# Patient Record
Sex: MALE | Race: WHITE | NOT HISPANIC OR LATINO | Employment: FULL TIME | URBAN - METROPOLITAN AREA
[De-identification: names, ages, dates, MRNs, and addresses within clinical notes are randomized per-mention and may not be internally consistent; named-entity substitution may affect disease eponyms.]

---

## 2017-04-03 ENCOUNTER — ALLSCRIPTS OFFICE VISIT (OUTPATIENT)
Dept: OTHER | Facility: OTHER | Age: 62
End: 2017-04-03

## 2017-04-03 DIAGNOSIS — Z00.00 ENCOUNTER FOR GENERAL ADULT MEDICAL EXAMINATION WITHOUT ABNORMAL FINDINGS: ICD-10-CM

## 2018-01-11 NOTE — PROGRESS NOTES
Assessment    1  Encounter for preventive health examination (V70 0) (Z00 00)   2  Cardiac arrhythmia (427 9) (I49 9)    Plan  Cardiac arrhythmia    · We want you to wear a Holter monitor ; Status:Complete;   Done: 44YKZ7884 09:28AM  Health Maintenance    · Follow-up visit in 1 year Evaluation and Treatment  Follow-up  Status: Hold For -  Scheduling  Requested for: 03Apr2017   · We encourage all of our patients to exercise regularly  30 minutes of exercise or physical  activity five or more days a week is recommended for children and adults ;  Status:Complete;   Done: 18ZZA8885 09:28AM   · We recommend routine visits to a dentist ; Status:Complete;   Done: 88JSN9073 09:28AM   · We recommend that you follow the "Mediterranean diet "; Status:Complete;   Done:  14MHZ4681 09:28AM    Check CBC, PSA, CMP, Fasting lipids  F/U after Holter     History of Present Illness  HM, Adult Male: The patient is being seen for a health maintenance evaluation  Social History: Household members include spouse  He is   Work status: working full time  The patient has never smoked cigarettes  General Health: He has regular dental visits  He complains of vision problems  Vision care includes wearing glasses  He denies hearing loss  Immunizations status: up to date  Lifestyle:  He consumes a diverse and healthy diet  He does not have any weight concerns  He exercises regularly  Reproductive health:  the patient is sexually active  Screening:   HPI: 62 y/o WM first visit in 1 1/2 yrs  Doing well, no acute c/o  However, he does report noting a low heart rate in the 40m ezekiel at night, more than his resting HR normally in the 50s  The pt does jog daily, at least 4miles/d and this has not changed  DeNies CP, SOB, dizziness, edema, fatigue  Review of Systems    Constitutional: as noted in HPI  Eyes: No complaints of eye pain, no red eyes, no discharge from eyes, no itchy eyes     ENT: no complaints of earache, no hearing loss, no nosebleeds, no nasal discharge, no sore throat, no hoarseness  Cardiovascular: slow heart rate, but as noted in HPI, no chest pain and no palpitations  Respiratory: No complaints of shortness of breath, no wheezing, no cough, no SOB on exertion, no orthopnea or PND  Gastrointestinal: No complaints of abdominal pain, no constipation, no nausea or vomiting, no diarrhea or bloody stools  Genitourinary: No complaints of dysuria, no incontinence, no hesitancy, no nocturia, no genital lesion, no testicular pain  Musculoskeletal: No complaints of arthralgia, no myalgias, no joint swelling or stiffness, no limb pain or swelling  Integumentary: No complaints of skin rash or skin lesions, no itching, no skin wound, no dry skin  Neurological: No compliants of headache, no confusion, no convulsions, no numbness or tingling, no dizziness or fainting, no limb weakness, no difficulty walking  Psychiatric: Is not suicidal, no sleep disturbances, no anxiety or depression, no change in personality, no emotional problems  Endocrine: No complaints of proptosis, no hot flashes, no muscle weakness, no erectile dysfunction, no deepening of the voice, no feelings of weakness  Hematologic/Lymphatic: No complaints of swollen glands, no swollen glands in the neck, does not bleed easily, no easy bruising  ROS reviewed  Past Medical History    · History of esophageal reflux (V12 79) (Z87 19)   · History of Left foot pain (729 5) (Y68 050)    Surgical History    · History of Oral Surgery Tooth Extraction Thurmond Tooth   · History of Umbilical Hernia Repair - Over Age 13    Family History  Mother    · Family history of Hodgkin's lymphoma (V16 7) (Z80 7)  Father    · No pertinent family history    Social History    · Admits alcohol use (V49 89) (Z78 9)   · Lives with spouse   ·    · Never a smoker    Current Meds   1  DHEA 25 MG Oral Capsule; Therapy: (Recorded:03Apr2017) to Recorded   2   EpiPen 2-Francesco 0 3 MG/0 3ML (1:1000) EJ; Therapy: (Recorded:03Apr2017) to Recorded   3  Fish Oil 1000 MG Oral Capsule; Therapy: (Recorded:03Apr2017) to Recorded   4  Multi For Him 50+ TABS; Therapy: (Recorded:03Apr2017) to Recorded   5  Prostate Therapy Complex Oral Capsule; Therapy: (Recorded:03Apr2017) to Recorded   6  Vitamin D-3 1000 UNIT Oral Capsule; Therapy: (Recorded:03Apr2017) to Recorded   7  V-R Vitamin C 500 MG TABS; Therapy: (Recorded:03Apr2017) to Recorded    Allergies    1  No Known Drug Allergies    Vitals   Recorded: [id number] 61:04VF   Systolic 581, LUE, Sitting   Diastolic 72, LUE, Sitting   Height 6 ft 1 in   Weight 164 lb    BMI Calculated 21 64   BSA Calculated 1 98     Physical Exam    Constitutional   General appearance: No acute distress, well appearing and well nourished  Eyes   Conjunctiva and lids: No swelling, erythema, or discharge  Pupils and irises: Equal, round and reactive to light  Ears, Nose, Mouth, and Throat   External inspection of ears and nose: Normal     Oropharynx: Normal with no erythema, edema, exudate or lesions  Pulmonary   Respiratory effort: No increased work of breathing or signs of respiratory distress  Auscultation of lungs: Clear to auscultation  Cardiovascular   Auscultation of heart: Normal rate and rhythm, normal S1 and S2, without murmurs  Examination of extremities for edema and/or varicosities: Normal     Abdomen   Abdomen: Non-tender, no masses  Liver and spleen: No hepatomegaly or splenomegaly  Lymphatic   Palpation of lymph nodes in neck: No lymphadenopathy  Musculoskeletal   Gait and station: Normal     Digits and nails: Normal without clubbing or cyanosis  Inspection/palpation of joints, bones, and muscles: Normal     Skin   Skin and subcutaneous tissue: Normal without rashes or lesions  Neurologic   Cranial nerves: Cranial nerves 2-12 intact  Reflexes: 2+ and symmetric  Sensation: No sensory loss      Psychiatric Orientation to person, place and time: Normal     Mood and affect: Normal        Signatures   Electronically signed by : DAÍRO Stevenson ; Apr  3 2017  9:29AM EST                       (Author)

## 2018-01-13 VITALS
WEIGHT: 164 LBS | BODY MASS INDEX: 21.74 KG/M2 | DIASTOLIC BLOOD PRESSURE: 72 MMHG | HEIGHT: 73 IN | SYSTOLIC BLOOD PRESSURE: 140 MMHG

## 2018-04-23 ENCOUNTER — TELEPHONE (OUTPATIENT)
Dept: FAMILY MEDICINE CLINIC | Facility: CLINIC | Age: 63
End: 2018-04-23

## 2018-04-23 ENCOUNTER — OFFICE VISIT (OUTPATIENT)
Dept: FAMILY MEDICINE CLINIC | Facility: CLINIC | Age: 63
End: 2018-04-23
Payer: COMMERCIAL

## 2018-04-23 VITALS
SYSTOLIC BLOOD PRESSURE: 108 MMHG | WEIGHT: 159 LBS | HEART RATE: 56 BPM | OXYGEN SATURATION: 99 % | HEIGHT: 73 IN | DIASTOLIC BLOOD PRESSURE: 74 MMHG | TEMPERATURE: 98.3 F | BODY MASS INDEX: 21.07 KG/M2

## 2018-04-23 DIAGNOSIS — Z12.5 SCREENING FOR PROSTATE CANCER: ICD-10-CM

## 2018-04-23 DIAGNOSIS — M25.552 HIP PAIN, LEFT: ICD-10-CM

## 2018-04-23 DIAGNOSIS — R10.32 LEFT LOWER QUADRANT PAIN: ICD-10-CM

## 2018-04-23 DIAGNOSIS — Z00.00 ROUTINE ADULT HEALTH MAINTENANCE: Primary | ICD-10-CM

## 2018-04-23 PROBLEM — I49.9 CARDIAC ARRHYTHMIA: Status: RESOLVED | Noted: 2017-04-03 | Resolved: 2018-04-23

## 2018-04-23 PROBLEM — I49.9 CARDIAC ARRHYTHMIA: Status: ACTIVE | Noted: 2017-04-03

## 2018-04-23 PROBLEM — H53.9 VISUAL DISTURBANCE OF ONE EYE: Status: ACTIVE | Noted: 2018-04-23

## 2018-04-23 PROCEDURE — 99214 OFFICE O/P EST MOD 30 MIN: CPT | Performed by: PHYSICIAN ASSISTANT

## 2018-04-23 RX ORDER — ASCORBIC ACID 500 MG
1000 TABLET ORAL
COMMUNITY
End: 2020-05-13 | Stop reason: CLARIF

## 2018-04-23 RX ORDER — EPINEPHRINE 0.3 MG/.3ML
INJECTION SUBCUTANEOUS
COMMUNITY

## 2018-04-23 RX ORDER — CHLORAL HYDRATE 500 MG
CAPSULE ORAL
COMMUNITY

## 2018-04-23 RX ORDER — UBIDECARENONE 30 MG
CAPSULE ORAL
COMMUNITY
End: 2022-06-20
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
## 2018-04-23 NOTE — ASSESSMENT & PLAN NOTE
Has been consistent for 6 months  May be due to aggravation of previous hernia repair versus other  Will order ultrasound left lower quadrant to evaluate further

## 2018-04-23 NOTE — ASSESSMENT & PLAN NOTE
Exam consistent with hip arthritis, which can radiate to the groin  Will order x-ray of the left hip  If positive for significant arthritis will recommend he avoid a lot of high impact activities such as running and transitioning to low impact activities such as swimming and cycling

## 2018-04-23 NOTE — ASSESSMENT & PLAN NOTE
Believed to be due to arterial occlusion per opthalmology w/u    Continue f/u with them   Advised not to drive without wife

## 2018-04-23 NOTE — TELEPHONE ENCOUNTER
Called patient's wife and discussed the topic of not allowing her  Grisel Barlow to drive until the medical issues are resolved and cleared by his eye dr  She agreed with that measure  She stated she would inform us of the outcome of the upcoming medical tests

## 2018-04-25 ENCOUNTER — HOSPITAL ENCOUNTER (OUTPATIENT)
Dept: ULTRASOUND IMAGING | Facility: HOSPITAL | Age: 63
Discharge: HOME | End: 2018-04-25
Payer: COMMERCIAL

## 2018-04-25 ENCOUNTER — TELEPHONE (OUTPATIENT)
Dept: FAMILY MEDICINE CLINIC | Facility: CLINIC | Age: 63
End: 2018-04-25

## 2018-04-25 ENCOUNTER — HOSPITAL ENCOUNTER (OUTPATIENT)
Dept: RADIOLOGY | Facility: HOSPITAL | Age: 63
Discharge: HOME | End: 2018-04-25
Payer: COMMERCIAL

## 2018-04-25 DIAGNOSIS — M25.552 HIP PAIN, LEFT: ICD-10-CM

## 2018-04-25 DIAGNOSIS — R10.32 LEFT LOWER QUADRANT PAIN: ICD-10-CM

## 2018-04-25 PROCEDURE — 76705 ECHO EXAM OF ABDOMEN: CPT

## 2018-04-25 PROCEDURE — 73502 X-RAY EXAM HIP UNI 2-3 VIEWS: CPT

## 2018-04-25 NOTE — TELEPHONE ENCOUNTER
Informed patient of normal abdominal ultrasound   ----- Message from Hesham Singleton PA-C sent at 4/25/2018 10:27 AM EDT -----  Please tell pt US was normal without evidence of recurrent hernia

## 2018-04-26 LAB
ALBUMIN SERPL-MCNC: 4.4 G/DL (ref 3.6–4.8)
ALBUMIN/GLOB SERPL: 1.8 {RATIO} (ref 1.2–2.2)
ALP SERPL-CCNC: 69 IU/L (ref 39–117)
ALT SERPL-CCNC: 19 IU/L (ref 0–44)
AMBIG ABBREV DEFAULT: NORMAL
AST SERPL-CCNC: 21 IU/L (ref 0–40)
BILIRUB SERPL-MCNC: 0.7 MG/DL (ref 0–1.2)
BUN SERPL-MCNC: 14 MG/DL (ref 8–27)
BUN/CREAT SERPL: 18 (ref 10–24)
CALCIUM SERPL-MCNC: 9.6 MG/DL (ref 8.6–10.2)
CHLORIDE SERPL-SCNC: 103 MMOL/L (ref 96–106)
CHOLEST SERPL-MCNC: 162 MG/DL (ref 100–199)
CO2 SERPL-SCNC: 28 MMOL/L (ref 18–29)
CREAT SERPL-MCNC: 0.77 MG/DL (ref 0.76–1.27)
GLOBULIN SER-MCNC: 2.5 G/DL (ref 1.5–4.5)
GLUCOSE SERPL-MCNC: 91 MG/DL (ref 65–99)
HDLC SERPL-MCNC: 56 MG/DL
LDLC SERPL CALC-MCNC: 96 MG/DL (ref 0–99)
POTASSIUM SERPL-SCNC: 4.5 MMOL/L (ref 3.5–5.2)
PROT SERPL-MCNC: 6.9 G/DL (ref 6–8.5)
PSA SERPL-MCNC: 4.7 NG/ML (ref 0–4)
SL AMB EGFR AFRICAN AMERICAN: 113 ML/MIN/1.73
SL AMB EGFR NON AFRICAN AMERICAN: 98 ML/MIN/1.73
SODIUM SERPL-SCNC: 143 MMOL/L (ref 134–144)
TRIGL SERPL-MCNC: 52 MG/DL (ref 0–149)

## 2018-05-03 ENCOUNTER — TELEPHONE (OUTPATIENT)
Dept: FAMILY MEDICINE CLINIC | Facility: CLINIC | Age: 63
End: 2018-05-03

## 2018-05-03 NOTE — TELEPHONE ENCOUNTER
----- Message from Boo Flood PA-C sent at 5/1/2018  1:25 PM EDT -----  Please tell pt xray showed hip arthritis  He should perform gentle stretching and start transitioning from running to lower impact activities such as cycling/swimming/ He may take aleve or advil as needed for pain    Contacted patient regarding hip xray results  Informed patient of arthritis and the need to switch to lower impact exercise  Inquired about results of carotid ultrasound  Patient unaware of that yet  Patient stated his vision is slowly coming back  He feels it gets better when his heart rate is increased  Informed patient to continue to follow up with his eye doctor and call us if he needs anything else  Lexi Kerns

## 2018-05-14 ENCOUNTER — TELEPHONE (OUTPATIENT)
Dept: FAMILY MEDICINE CLINIC | Facility: CLINIC | Age: 63
End: 2018-05-14

## 2018-05-14 NOTE — TELEPHONE ENCOUNTER
Pt's wife called requesting names of local neurologists  She stated that her 's eye dr has recommended him to see a neurologist  I gave her the names and numbers for [facility], [facility] and [facility]   Roya Schuster

## 2018-08-15 ENCOUNTER — TELEPHONE (OUTPATIENT)
Dept: FAMILY MEDICINE CLINIC | Facility: CLINIC | Age: 63
End: 2018-08-15

## 2018-08-15 DIAGNOSIS — M25.552 HIP PAIN, LEFT: Primary | ICD-10-CM

## 2018-08-15 NOTE — TELEPHONE ENCOUNTER
Patient called stating that he stopped running and his left hip pain is getting worse  Does patient need to come see you again for this or can you recommend someone to see? Please advise  Thanks

## 2018-08-20 ENCOUNTER — TELEPHONE (OUTPATIENT)
Dept: FAMILY MEDICINE CLINIC | Facility: CLINIC | Age: 63
End: 2018-08-20

## 2018-08-20 NOTE — TELEPHONE ENCOUNTER
----- Message from Mic Joyner PA-C sent at 8/15/2018  9:51 AM EDT -----  Referral to ortho Dr Jero Pro placed   Ask his preferred pharmacy and I can send meloxicam to be taken once daily with food to help with sx

## 2018-08-20 NOTE — TELEPHONE ENCOUNTER
Left message on patients answering machine to return phone call regarding referral and pharmacy for rx

## 2018-08-22 DIAGNOSIS — M25.552 HIP PAIN, LEFT: Primary | ICD-10-CM

## 2018-08-22 RX ORDER — MELOXICAM 15 MG/1
15 TABLET ORAL DAILY
Qty: 30 TABLET | Refills: 5 | Status: SHIPPED | OUTPATIENT
Start: 2018-08-22 | End: 2018-10-01 | Stop reason: SDUPTHER

## 2018-08-22 NOTE — TELEPHONE ENCOUNTER
Patient returned phone call stating that he has appointment with Ortho on 8/31/18  Can you please send the Meloxicam into his pharmacy? I entered his pharmacy into his chart   Thanks

## 2018-08-30 ENCOUNTER — APPOINTMENT (OUTPATIENT)
Dept: RADIOLOGY | Facility: CLINIC | Age: 63
End: 2018-08-30
Payer: COMMERCIAL

## 2018-08-30 ENCOUNTER — TRANSCRIBE ORDERS (OUTPATIENT)
Dept: ADMINISTRATIVE | Facility: HOSPITAL | Age: 63
End: 2018-08-30

## 2018-08-30 VITALS
BODY MASS INDEX: 21.52 KG/M2 | DIASTOLIC BLOOD PRESSURE: 66 MMHG | WEIGHT: 162.4 LBS | HEIGHT: 73 IN | SYSTOLIC BLOOD PRESSURE: 116 MMHG | HEART RATE: 47 BPM

## 2018-08-30 DIAGNOSIS — M16.12 PRIMARY OSTEOARTHRITIS OF ONE HIP, LEFT: ICD-10-CM

## 2018-08-30 DIAGNOSIS — M25.552 HIP PAIN, LEFT: Primary | ICD-10-CM

## 2018-08-30 DIAGNOSIS — M25.552 HIP PAIN, LEFT: ICD-10-CM

## 2018-08-30 DIAGNOSIS — M16.12 PRIMARY OSTEOARTHRITIS OF ONE HIP, LEFT: Primary | ICD-10-CM

## 2018-08-30 PROCEDURE — 99203 OFFICE O/P NEW LOW 30 MIN: CPT | Performed by: ORTHOPAEDIC SURGERY

## 2018-08-30 PROCEDURE — 73502 X-RAY EXAM HIP UNI 2-3 VIEWS: CPT
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
## 2018-09-28 ENCOUNTER — HOSPITAL ENCOUNTER (OUTPATIENT)
Dept: RADIOLOGY | Facility: HOSPITAL | Age: 63
Discharge: HOME | End: 2018-09-28

## 2018-09-28 ENCOUNTER — HOSPITAL ENCOUNTER (OUTPATIENT)
Dept: RADIOLOGY | Facility: HOSPITAL | Age: 63
Discharge: HOME | End: 2018-09-28
Attending: ORTHOPAEDIC SURGERY
Payer: COMMERCIAL

## 2018-09-28 ENCOUNTER — HOSPITAL ENCOUNTER (OUTPATIENT)
Dept: MRI IMAGING | Facility: HOSPITAL | Age: 63
Discharge: HOME | End: 2018-09-28
Attending: ORTHOPAEDIC SURGERY
Payer: COMMERCIAL

## 2018-09-28 DIAGNOSIS — M25.552 HIP PAIN, LEFT: ICD-10-CM

## 2018-09-28 DIAGNOSIS — M16.12 PRIMARY OSTEOARTHRITIS OF ONE HIP, LEFT: ICD-10-CM

## 2018-09-28 DIAGNOSIS — T15.90XA FOREIGN BODY, EYE, UNSPECIFIED LATERALITY, INITIAL ENCOUNTER: ICD-10-CM

## 2018-09-28 PROCEDURE — 77002 NEEDLE LOCALIZATION BY XRAY: CPT

## 2018-09-28 PROCEDURE — 73722 MRI JOINT OF LWR EXTR W/DYE: CPT

## 2018-09-28 PROCEDURE — 27095 INJECTION FOR HIP X-RAY: CPT

## 2018-09-28 PROCEDURE — A9585 GADOBUTROL INJECTION: HCPCS | Performed by: ORTHOPAEDIC SURGERY

## 2018-09-28 RX ORDER — 0.9 % SODIUM CHLORIDE 0.9 %
50 VIAL (ML) INJECTION
Status: DISCONTINUED | OUTPATIENT
Start: 2018-09-28 | End: 2018-09-29 | Stop reason: HOSPADM

## 2018-09-28 RX ORDER — LIDOCAINE HYDROCHLORIDE 10 MG/ML
10 INJECTION, SOLUTION INFILTRATION; PERINEURAL
Status: DISCONTINUED | OUTPATIENT
Start: 2018-09-28 | End: 2018-09-29 | Stop reason: HOSPADM

## 2018-09-28 RX ADMIN — GADOBUTROL 0.2 ML: 604.72 INJECTION INTRAVENOUS at 08:35

## 2018-09-28 RX ADMIN — IOHEXOL 50 ML: 300 INJECTION, SOLUTION INTRAVENOUS at 08:35

## 2018-10-01 VITALS
DIASTOLIC BLOOD PRESSURE: 74 MMHG | BODY MASS INDEX: 21.6 KG/M2 | HEIGHT: 73 IN | HEART RATE: 80 BPM | SYSTOLIC BLOOD PRESSURE: 128 MMHG | WEIGHT: 163 LBS

## 2018-10-01 DIAGNOSIS — M25.552 HIP PAIN, LEFT: Primary | ICD-10-CM

## 2018-10-01 DIAGNOSIS — S73.192A TEAR OF LEFT ACETABULAR LABRUM, INITIAL ENCOUNTER: ICD-10-CM

## 2018-10-01 PROCEDURE — 99213 OFFICE O/P EST LOW 20 MIN: CPT | Performed by: ORTHOPAEDIC SURGERY

## 2018-10-01 RX ORDER — MELOXICAM 15 MG/1
15 TABLET ORAL DAILY
Qty: 30 TABLET | Refills: 0 | Status: SHIPPED | OUTPATIENT
Start: 2018-10-01 | End: 2019-08-23

## 2018-10-01 NOTE — PROGRESS NOTES
Assessment/Plan     1  Hip pain, left    2  Tear of left acetabular labrum, initial encounter      Orders Placed This Encounter   Procedures    Ambulatory referral to Physical Therapy   -patient will start physical therapy  -continue with meloxicam as needed for pain control  -will consider steroid injection in left hip pain persist    Return for 4-6 weeks   Subjective   Chief Complaint:   Chief Complaint   Patient presents with    Left Hip - Pain       Tammy Chaney is a 66 y o  male who presents for follow up for left hip pain today  He states he still has some pain in his groin that radiates down his thigh  He does have some numbness and tingling in his anterior thigh  He has been taking meloxicam which has been helping with his knee pain  His hip pain has been improving since he was last here  He is here to go over his MRI results  He has not had a steroid injection  He has not done any physical therapy  Review of Systems  See HPI for musculoskeletal review     All other systems reviewed are negative     History:  Past Medical History:   Diagnosis Date    Esophageal reflux      Past Surgical History:   Procedure Laterality Date    [place] INJECTION LEFT HIP (ARTHROGRAM)  8/75/1628    UMBILICAL HERNIA REPAIR      over age 13   [facility] [de[facility]identified[facility] TOOTH [facility]TRACTION       Social History   History   Alcohol Use    Yes     History   Drug use: Unknown     History   Smoking Status    Never Smoker   Smokeless Tobacco    Never Used     Family History:   Family History   Problem Relation Age of Onset    Hodgkin's lymphoma Mother     No Known Problems Father        Meds/Allergies     (Not in a hospital admission)  Allergies   Allergen Reactions    Bee Venom           Objective     /74   Pulse 80   Ht 6' 1" (1 854 m)   Wt 73 9 kg (163 lb)   BMI 21 51 kg/m²      PE:  AAOx 3  WDWN  Hearing intact, no drainage from eyes  no audible wheezing  no abdominal distension  LE compartments soft, skin intact    Ortho Exam:  left hip:   No dislocation/deformity  ROM: full no pain with ROM  No TTP over greater trochanter  No TTP over SIJ  Neg  Geovanny test  Neg   Ravindra's test  Abduction 5/5  AT/GS intact    Imaging Studies: I have personally reviewed pertinent films in PACS  MRI left- left anterior/superior labral tear

## 2018-10-04 ENCOUNTER — EVALUATION (OUTPATIENT)
Dept: PHYSICAL THERAPY | Facility: CLINIC | Age: 63
End: 2018-10-04
Payer: COMMERCIAL

## 2018-10-04 DIAGNOSIS — S73.192A TEAR OF LEFT ACETABULAR LABRUM, INITIAL ENCOUNTER: ICD-10-CM

## 2018-10-04 DIAGNOSIS — M25.552 LEFT HIP PAIN: Primary | ICD-10-CM

## 2018-10-04 PROCEDURE — G8981 BODY POS CURRENT STATUS: HCPCS

## 2018-10-04 PROCEDURE — 97140 MANUAL THERAPY 1/> REGIONS: CPT

## 2018-10-04 PROCEDURE — 97110 THERAPEUTIC EXERCISES: CPT

## 2018-10-04 PROCEDURE — G8982 BODY POS GOAL STATUS: HCPCS

## 2018-10-04 PROCEDURE — 97161 PT EVAL LOW COMPLEX 20 MIN: CPT
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
## 2018-10-04 NOTE — PROGRESS NOTES
{SL AMB PT/OT NOTE ONEN:96034}    Today's date: 10/4/2018  Patient name: Tammy Chaney  : 1955  MRN: [id number]  Referring provider: Jace Coronado  Dx:   Encounter Diagnosis     ICD-10-CM    1  Left hip pain M25 552    2   Tear of left acetabular labrum, initial encounter S73 192A                   Assessment/Plan    Subjective    Objective        Precautions: ***    Daily Treatment Diary     Manual                                                                                   Exercise Diary                                                                                                                                                                                                                                                                                      Modalities

## 2018-10-09 ENCOUNTER — OFFICE VISIT (OUTPATIENT)
Dept: PHYSICAL THERAPY | Facility: CLINIC | Age: 63
End: 2018-10-09
Payer: COMMERCIAL

## 2018-10-09 DIAGNOSIS — M25.552 LEFT HIP PAIN: Primary | ICD-10-CM

## 2018-10-09 DIAGNOSIS — S73.192A TEAR OF LEFT ACETABULAR LABRUM, INITIAL ENCOUNTER: ICD-10-CM

## 2018-10-09 PROCEDURE — 97014 ELECTRIC STIMULATION THERAPY: CPT | Performed by: PHYSICAL THERAPIST

## 2018-10-09 PROCEDURE — 97110 THERAPEUTIC EXERCISES: CPT | Performed by: PHYSICAL THERAPIST

## 2018-10-09 PROCEDURE — 97140 MANUAL THERAPY 1/> REGIONS: CPT | Performed by: PHYSICAL THERAPIST

## 2018-10-09 NOTE — PROGRESS NOTES
Daily Note     Today's date: 10/9/2018  Patient name: Faraz Bradshaw  : 1955  MRN: [id number]  Referring provider: Deidre Singleton  Dx:   Encounter Diagnosis     ICD-10-CM    1  Left hip pain M25 552    2  Tear of left acetabular labrum, initial encounter S73 192A                 Visit 2    Subjective: Pt reports he felt good after IE and he denies any increased pain  Objective: See treatment diary below  The patient performed the following as per flow sheet:   --Therapeutic Exercises for 25 minutes  --Manual Therapy for 20 minutes  --Moist Heat and TENS for 15 minutes  Assessment: Pt progress is good  We worked on [facility] to the affected area as well as some eccentric retraining and nerve gliding to try and free up thsat area  He felt very good after TX  Plan: Continue per plan of care         Daily Treatment Diary     Manual  10/8            L/S and hip OMT 20'                                                                    Exercise Diary              Hip flex str 3x:30            Fall out 2x15            Bridge w/ band 15x:10            Fem N glide 30x            Ecc hip flex 20x            Lunge to step 20x                                                                                                                                                                                                      Modalities              HP/TENS 15'

## 2018-10-11 ENCOUNTER — OFFICE VISIT (OUTPATIENT)
Dept: PHYSICAL THERAPY | Facility: CLINIC | Age: 63
End: 2018-10-11
Payer: COMMERCIAL

## 2018-10-11 DIAGNOSIS — S73.192A TEAR OF LEFT ACETABULAR LABRUM, INITIAL ENCOUNTER: ICD-10-CM

## 2018-10-11 DIAGNOSIS — M25.552 LEFT HIP PAIN: Primary | ICD-10-CM

## 2018-10-11 PROCEDURE — 97014 ELECTRIC STIMULATION THERAPY: CPT | Performed by: PHYSICAL THERAPIST

## 2018-10-11 PROCEDURE — 97110 THERAPEUTIC EXERCISES: CPT | Performed by: PHYSICAL THERAPIST

## 2018-10-11 PROCEDURE — 97140 MANUAL THERAPY 1/> REGIONS: CPT | Performed by: PHYSICAL THERAPIST

## 2018-10-11 NOTE — PROGRESS NOTES
Daily Note     Today's date: 10/11/2018  Patient name: Hui Deutsch  : 1955  MRN: [id number]  Referring provider: Dionne Geller  Dx:   Encounter Diagnosis     ICD-10-CM    1  Left hip pain M25 552    2  Tear of left acetabular labrum, initial encounter S73 192A                 Visit 3    Subjective: Pt reports the pain is lessening as well as the numbness too  Objective: See treatment diary below  The patient performed the following as per flow sheet:   --Therapeutic Exercises for 30 minutes  --Manual Therapy for 20 minutes  --Moist Heat and TENS for 15 minutes  Assessment: Pt progress is good  We are working on mobilizing the soft tissue around the hip as well as the nerves  From his remarks, he seems to be doing well  Plan: Continue per plan of care  Addendum 10/15/18:  Pt called in and requested D/C due to resolution of symptoms      Manual  10/8  10/11                   L/S and hip OMT 20'  20                                                                                                                         Exercise Diary                        Hip flex str 3x:30  3x                   Fall out 2x15  30x                   Bridge w/ band 15x:10  15x                   Fem N glide 30x  30x                   Ecc hip flex 20x  20x                   Lunge to step 20x  20x                                                                                                                                                                                                                                                                                                                                                                         Modalities                        HP/TENS 15' 15

## 2018-10-16 ENCOUNTER — APPOINTMENT (OUTPATIENT)
Dept: PHYSICAL THERAPY | Facility: CLINIC | Age: 63
End: 2018-10-16
Payer: COMMERCIAL

## 2018-10-18 ENCOUNTER — APPOINTMENT (OUTPATIENT)
Dept: PHYSICAL THERAPY | Facility: CLINIC | Age: 63
End: 2018-10-18
Payer: COMMERCIAL

## 2018-10-23 ENCOUNTER — APPOINTMENT (OUTPATIENT)
Dept: PHYSICAL THERAPY | Facility: CLINIC | Age: 63
End: 2018-10-23
Payer: COMMERCIAL

## 2018-10-25 ENCOUNTER — APPOINTMENT (OUTPATIENT)
Dept: PHYSICAL THERAPY | Facility: CLINIC | Age: 63
End: 2018-10-25
Payer: COMMERCIAL

## 2018-10-30 ENCOUNTER — APPOINTMENT (OUTPATIENT)
Dept: PHYSICAL THERAPY | Facility: CLINIC | Age: 63
End: 2018-10-30
Payer: COMMERCIAL

## 2019-08-23 ENCOUNTER — OFFICE VISIT (OUTPATIENT)
Dept: FAMILY MEDICINE CLINIC | Facility: CLINIC | Age: 64
End: 2019-08-23
Payer: COMMERCIAL

## 2019-08-23 VITALS
OXYGEN SATURATION: 98 % | HEART RATE: 50 BPM | DIASTOLIC BLOOD PRESSURE: 80 MMHG | BODY MASS INDEX: 20.15 KG/M2 | HEIGHT: 73 IN | SYSTOLIC BLOOD PRESSURE: 110 MMHG | RESPIRATION RATE: 16 BRPM | TEMPERATURE: 97.6 F | WEIGHT: 152 LBS

## 2019-08-23 DIAGNOSIS — N40.0 ENLARGED PROSTATE: ICD-10-CM

## 2019-08-23 DIAGNOSIS — Z12.11 SCREENING FOR COLON CANCER: ICD-10-CM

## 2019-08-23 DIAGNOSIS — R97.20 ELEVATED PSA: ICD-10-CM

## 2019-08-23 DIAGNOSIS — R00.1 BRADYCARDIA: ICD-10-CM

## 2019-08-23 DIAGNOSIS — H53.9 VISUAL DISTURBANCE OF ONE EYE: ICD-10-CM

## 2019-08-23 DIAGNOSIS — Z00.00 ADULT GENERAL MEDICAL EXAM: Primary | ICD-10-CM

## 2019-08-23 DIAGNOSIS — Z11.59 NEED FOR HEPATITIS C SCREENING TEST: ICD-10-CM

## 2019-08-23 DIAGNOSIS — R03.0 BLOOD PRESSURE ELEVATED WITHOUT HISTORY OF HTN: ICD-10-CM

## 2019-08-23 DIAGNOSIS — F12.90 MARIJUANA USE: ICD-10-CM

## 2019-08-23 DIAGNOSIS — K40.90 RIGHT INGUINAL HERNIA: ICD-10-CM

## 2019-08-23 PROCEDURE — 82270 OCCULT BLOOD FECES: CPT | Performed by: FAMILY MEDICINE

## 2019-08-23 PROCEDURE — 99396 PREV VISIT EST AGE 40-64: CPT | Performed by: FAMILY MEDICINE

## 2019-08-23 PROCEDURE — 3008F BODY MASS INDEX DOCD: CPT | Performed by: FAMILY MEDICINE

## 2019-08-23 PROCEDURE — 99214 OFFICE O/P EST MOD 30 MIN: CPT | Performed by: FAMILY MEDICINE

## 2019-08-23 NOTE — PROGRESS NOTES
Assessment/Plan:          Diagnoses and all orders for this visit:    Adult general medical exam  Comments:  recommend flu shot and shingrix    Orders:  -     Lipid Panel with Direct LDL reflex; Future  -     Comprehensive metabolic panel; Future  -     CBC and differential; Future    Need for hepatitis C screening test  -     Hepatitis C antibody; Future    Bradycardia  Comments:   asymptomatic  Advised to recheck Holter monitor to rule out less than 40 beat heart rate  Patient declined    Elevated PSA  Comments:   referred to Urology  Discussed with patient rule out prostate cancer  Patient declined  Orders:  -     PSA, total and free; Future  -     Urinalysis with reflex to microscopic    Blood pressure elevated without history of HTN  Comments:   recheck blood pressure with next office visit    Visual disturbance of one eye  Comments:  recommend finishing work up ,holter , echo/ Doppler  Patient declined  advised to follow with Ophthalmology    Screening for colon cancer  Comments:   pt declined colonoscopy  Orders:  -     POCT hemoccult screening    Right inguinal hernia  Comments:   recommend referral to surgery  Patient declined  patient to call if he developed pain or hernia get enlarged    Enlarged prostate    Marijuana use  Comments:   consult patient about stopping vaping marijuana            Subjective:     Patient ID: Augustus Busby is a 60 y o  male      Pt is her for his annual physical exam       Immunization ,patient had Tdap at work 3 years ago  He does get hearing test every year at work   Dental care he sees his dentist periodically  Eye care Last time he saw his eye doctor about a year ago   exercise patient Walks  4 miles a day he used to run 6 miles a day  Diet is regular  Admit to history of scoliosis since he was a child  had colonoscopy at 50years old and was benign and 64years old and also was benign  patient started vAPING marijuana about a year ago    He used to do it once a day  Now he vapes once a week          Review of Systems    Objective:     Physical Exam   Constitutional: He is oriented to person, place, and time  He appears well-developed and well-nourished  No distress  HENT:   Head: Normocephalic and atraumatic  Right Ear: External ear normal    Left Ear: External ear normal    Nose: Nose normal    Mouth/Throat: Oropharynx is clear and moist  No oropharyngeal exudate  Whisper test is normal bilaterally from 5 ft   Eyes: Pupils are equal, round, and reactive to light  Conjunctivae and EOM are normal  Right eye exhibits no discharge  Left eye exhibits no discharge  No scleral icterus  Neck: Normal range of motion  Neck supple  No JVD present  No tracheal deviation present  No thyromegaly present  Cardiovascular: Normal rate, regular rhythm, normal heart sounds and intact distal pulses  Exam reveals no gallop and no friction rub  No murmur heard  Pulses:       Carotid pulses are 3+ on the right side, and 3+ on the left side  Legs , no edema    Pulmonary/Chest: Effort normal and breath sounds normal  No stridor  No respiratory distress  He has no wheezes  He has no rales  Abdominal: Soft  Bowel sounds are normal  He exhibits no distension and no mass  There is no tenderness  There is no rebound and no guarding  No hernia  Genitourinary: Rectum normal and penis normal  Rectal exam shows guaiac negative stool  Genitourinary Comments: Enlarged prostate  Testicle is normal bilaterally  Small right inguinal hernia  reducible   Musculoskeletal: Normal range of motion  He exhibits no edema or tenderness  Scoliosis   Lymphadenopathy:     He has no cervical adenopathy  Neurological: He is alert and oriented to person, place, and time  He displays normal reflexes  No cranial nerve deficit or sensory deficit  He exhibits normal muscle tone  Coordination normal    Normal gait,Normal Babinski  Negative Romberg   Skin: No rash noted  No erythema  No pallor  Psychiatric: He has a normal mood and affect   His behavior is normal  Judgment and thought content normal

## 2019-09-14 LAB
ALBUMIN SERPL-MCNC: 4.4 G/DL (ref 3.6–4.8)
ALBUMIN/GLOB SERPL: 1.9 {RATIO} (ref 1.2–2.2)
ALP SERPL-CCNC: 68 IU/L (ref 39–117)
ALT SERPL-CCNC: 30 IU/L (ref 0–44)
APPEARANCE UR: ABNORMAL
AST SERPL-CCNC: 22 IU/L (ref 0–40)
BACTERIA URNS QL MICRO: ABNORMAL
BASOPHILS # BLD AUTO: 0 X10E3/UL (ref 0–0.2)
BASOPHILS NFR BLD AUTO: 1 %
BILIRUB SERPL-MCNC: 0.7 MG/DL (ref 0–1.2)
BILIRUB UR QL STRIP: NEGATIVE
BUN SERPL-MCNC: 16 MG/DL (ref 8–27)
BUN/CREAT SERPL: 19 (ref 10–24)
CALCIUM SERPL-MCNC: 9.7 MG/DL (ref 8.6–10.2)
CHLORIDE SERPL-SCNC: 102 MMOL/L (ref 96–106)
CHOLEST SERPL-MCNC: 162 MG/DL (ref 100–199)
CO2 SERPL-SCNC: 24 MMOL/L (ref 20–29)
COLOR UR: YELLOW
CREAT SERPL-MCNC: 0.83 MG/DL (ref 0.76–1.27)
CRYSTALS URNS MICRO: ABNORMAL
EOSINOPHIL # BLD AUTO: 0.1 X10E3/UL (ref 0–0.4)
EOSINOPHIL NFR BLD AUTO: 3 %
EPI CELLS #/AREA URNS HPF: ABNORMAL /HPF (ref 0–10)
ERYTHROCYTE [DISTWIDTH] IN BLOOD BY AUTOMATED COUNT: 13.4 % (ref 12.3–15.4)
GLOBULIN SER-MCNC: 2.3 G/DL (ref 1.5–4.5)
GLUCOSE SERPL-MCNC: 99 MG/DL (ref 65–99)
GLUCOSE UR QL: NEGATIVE
HCT VFR BLD AUTO: 39.6 % (ref 37.5–51)
HCV AB S/CO SERPL IA: <0.1 S/CO RATIO (ref 0–0.9)
HDLC SERPL-MCNC: 64 MG/DL
HGB BLD-MCNC: 12.9 G/DL (ref 13–17.7)
HGB UR QL STRIP: NEGATIVE
IMM GRANULOCYTES # BLD: 0 X10E3/UL (ref 0–0.1)
IMM GRANULOCYTES NFR BLD: 0 %
KETONES UR QL STRIP: NEGATIVE
LDLC SERPL CALC-MCNC: 86 MG/DL (ref 0–99)
LDLC/HDLC SERPL: 1.3 RATIO (ref 0–3.6)
LEUKOCYTE ESTERASE UR QL STRIP: NEGATIVE
LYMPHOCYTES # BLD AUTO: 1.1 X10E3/UL (ref 0.7–3.1)
LYMPHOCYTES NFR BLD AUTO: 28 %
MCH RBC QN AUTO: 30.9 PG (ref 26.6–33)
MCHC RBC AUTO-ENTMCNC: 32.6 G/DL (ref 31.5–35.7)
MCV RBC AUTO: 95 FL (ref 79–97)
MICRO URNS: ABNORMAL
MICRO URNS: ABNORMAL
MONOCYTES # BLD AUTO: 0.3 X10E3/UL (ref 0.1–0.9)
MONOCYTES NFR BLD AUTO: 7 %
MUCOUS THREADS URNS QL MICRO: PRESENT
NEUTROPHILS # BLD AUTO: 2.4 X10E3/UL (ref 1.4–7)
NEUTROPHILS NFR BLD AUTO: 61 %
NITRITE UR QL STRIP: NEGATIVE
PH UR STRIP: 7 [PH] (ref 5–7.5)
PLATELET # BLD AUTO: 214 X10E3/UL (ref 150–450)
POTASSIUM SERPL-SCNC: 4.8 MMOL/L (ref 3.5–5.2)
PROT SERPL-MCNC: 6.7 G/DL (ref 6–8.5)
PROT UR QL STRIP: NEGATIVE
PSA FREE MFR SERPL: 15.8 %
PSA FREE SERPL-MCNC: 0.71 NG/ML
PSA SERPL-MCNC: 4.5 NG/ML (ref 0–4)
RBC # BLD AUTO: 4.17 X10E6/UL (ref 4.14–5.8)
RBC #/AREA URNS HPF: ABNORMAL /HPF (ref 0–2)
SL AMB EGFR AFRICAN AMERICAN: 109 ML/MIN/1.73
SL AMB EGFR NON AFRICAN AMERICAN: 94 ML/MIN/1.73
SL AMB VLDL CHOLESTEROL CALC: 12 MG/DL (ref 5–40)
SODIUM SERPL-SCNC: 141 MMOL/L (ref 134–144)
SP GR UR: 1.02 (ref 1–1.03)
TRIGL SERPL-MCNC: 60 MG/DL (ref 0–149)
UNIDENT CRYS URNS QL MICRO: PRESENT
UROBILINOGEN UR STRIP-ACNC: 0.2 MG/DL (ref 0.2–1)
WBC # BLD AUTO: 4 X10E3/UL (ref 3.4–10.8)
WBC #/AREA URNS HPF: ABNORMAL /HPF (ref 0–5)

## 2019-09-30 ENCOUNTER — OFFICE VISIT (OUTPATIENT)
Dept: FAMILY MEDICINE CLINIC | Facility: CLINIC | Age: 64
End: 2019-09-30
Payer: COMMERCIAL

## 2019-09-30 VITALS
OXYGEN SATURATION: 98 % | HEIGHT: 72 IN | WEIGHT: 156 LBS | TEMPERATURE: 96.6 F | SYSTOLIC BLOOD PRESSURE: 120 MMHG | BODY MASS INDEX: 21.13 KG/M2 | HEART RATE: 50 BPM | DIASTOLIC BLOOD PRESSURE: 70 MMHG

## 2019-09-30 DIAGNOSIS — Z28.20 IMMUNIZATION NOT CARRIED OUT BECAUSE OF PATIENT DECISION: ICD-10-CM

## 2019-09-30 DIAGNOSIS — R03.0 BLOOD PRESSURE ELEVATED WITHOUT HISTORY OF HTN: ICD-10-CM

## 2019-09-30 DIAGNOSIS — R97.20 ELEVATED PSA: Primary | ICD-10-CM

## 2019-09-30 DIAGNOSIS — R82.90 ABNORMAL URINE FINDINGS: ICD-10-CM

## 2019-09-30 DIAGNOSIS — R71.0 DECREASED HEMOGLOBIN: ICD-10-CM

## 2019-09-30 PROCEDURE — 3008F BODY MASS INDEX DOCD: CPT | Performed by: FAMILY MEDICINE

## 2019-09-30 PROCEDURE — 99214 OFFICE O/P EST MOD 30 MIN: CPT | Performed by: FAMILY MEDICINE

## 2019-09-30 NOTE — PROGRESS NOTES
The Assessment/Plan:          Diagnoses and all orders for this visit:    Elevated PSA  -     Ambulatory referral to Urology; Future    Abnormal urine findings  -     Urine culture; Future    Blood pressure elevated without history of HTN  Comments:  Today blood pressure is normal ,will keep monitoring his blood pressure    Decreased hemoglobin  -     Ferritin; Future  -     Iron Saturation %; Future  -     TIBC; Future  -     Iron; Future    Immunization not carried out because of patient decision  Comments:  Flu shot  Other orders  -     Nutritional Supplements (DHEA PO); Take by mouth            Subjective:     Patient ID: Ce Boyce is a 60 y o  male      Patient is here for follow-up on his medical problems  Elevated blood pressure  Denied headache, flushing or dizziness  Enlarged prostate  He denied problem with urination  Elevated PSA  Patient denied weight loss problem with urination or back pain  Bradycardia  Denied shortness of breath, fatigue or dizziness        Test results  Lab done on September 13, 2019  Discussed result with patient    Had Tdap at work in 2016 patient will bring the date      Review of Systems   Constitutional: Negative for activity change, appetite change, chills, fatigue, fever and unexpected weight change  HENT: Negative for congestion, ear discharge, ear pain, hearing loss, nosebleeds, rhinorrhea, sinus pressure, sore throat, tinnitus, trouble swallowing and voice change  Eyes: Negative for photophobia, pain and visual disturbance  Respiratory: Negative for cough, chest tightness, shortness of breath and wheezing  Cardiovascular: Negative for chest pain, palpitations and leg swelling  Gastrointestinal: Negative for abdominal pain, anal bleeding, blood in stool, constipation, diarrhea, nausea and vomiting  Endocrine: Negative for cold intolerance, heat intolerance, polydipsia and polyuria     Genitourinary: Negative for dysuria, frequency, hematuria and urgency  Musculoskeletal: Negative for arthralgias, back pain, gait problem, joint swelling, myalgias and neck pain  Skin: Negative for rash  Neurological: Negative for dizziness, tremors, seizures, syncope, weakness, light-headedness and headaches  Hematological: Negative for adenopathy  Does not bruise/bleed easily  Psychiatric/Behavioral: Negative for agitation, behavioral problems, confusion, dysphoric mood, hallucinations and sleep disturbance  The patient is not nervous/anxious  Objective:     Physical Exam   Constitutional: He is oriented to person, place, and time  He appears well-developed and well-nourished  No distress  HENT:   Head: Normocephalic  Mouth/Throat: Oropharynx is clear and moist  No oropharyngeal exudate  Eyes: Pupils are equal, round, and reactive to light  EOM are normal  No scleral icterus  Neck: Normal range of motion  Neck supple  No JVD present  No tracheal deviation present  Cardiovascular: Normal rate, regular rhythm and normal heart sounds  Exam reveals no gallop and no friction rub  No murmur heard  Pulses:       Carotid pulses are 3+ on the right side, and 3+ on the left side  Legs , no edema    Pulmonary/Chest: Effort normal and breath sounds normal    Abdominal: Soft  Bowel sounds are normal  He exhibits no distension and no mass  There is no tenderness  There is no rebound and no guarding  Genitourinary:   Genitourinary Comments: Patient declined   Musculoskeletal: Normal range of motion  He exhibits no edema or tenderness  Lymphadenopathy:     He has no cervical adenopathy  Neurological: He is alert and oriented to person, place, and time  No cranial nerve deficit  He exhibits normal muscle tone  Coordination normal    Normal gait   Skin: No rash noted  Psychiatric: He has a normal mood and affect   His behavior is normal

## 2019-11-07 LAB
BACTERIA UR CULT: NORMAL
FERRITIN SERPL-MCNC: 137 NG/ML (ref 30–400)
IRON SATN MFR SERPL: 33 % (ref 15–55)
IRON SERPL-MCNC: 101 UG/DL (ref 38–169)
Lab: NO GROWTH
TIBC SERPL-MCNC: 305 UG/DL (ref 250–450)
UIBC SERPL-MCNC: 204 UG/DL (ref 111–343)

## 2019-11-11 ENCOUNTER — OFFICE VISIT (OUTPATIENT)
Dept: FAMILY MEDICINE CLINIC | Facility: CLINIC | Age: 64
End: 2019-11-11
Payer: COMMERCIAL

## 2019-11-11 VITALS
HEIGHT: 72 IN | BODY MASS INDEX: 21.78 KG/M2 | TEMPERATURE: 96.3 F | SYSTOLIC BLOOD PRESSURE: 112 MMHG | OXYGEN SATURATION: 98 % | HEART RATE: 50 BPM | WEIGHT: 160.8 LBS | DIASTOLIC BLOOD PRESSURE: 70 MMHG

## 2019-11-11 DIAGNOSIS — R03.0 BLOOD PRESSURE ELEVATED WITHOUT HISTORY OF HTN: ICD-10-CM

## 2019-11-11 DIAGNOSIS — R00.1 BRADYCARDIA: ICD-10-CM

## 2019-11-11 DIAGNOSIS — Z11.4 ENCOUNTER FOR SCREENING FOR HIV: ICD-10-CM

## 2019-11-11 DIAGNOSIS — Z28.20 IMMUNIZATION NOT CARRIED OUT BECAUSE OF PATIENT DECISION: ICD-10-CM

## 2019-11-11 DIAGNOSIS — R71.0 DECREASED HEMOGLOBIN: ICD-10-CM

## 2019-11-11 DIAGNOSIS — R97.20 ELEVATED PSA: Primary | ICD-10-CM

## 2019-11-11 DIAGNOSIS — R82.90 ABNORMAL URINE FINDINGS: ICD-10-CM

## 2019-11-11 PROCEDURE — 99214 OFFICE O/P EST MOD 30 MIN: CPT | Performed by: FAMILY MEDICINE

## 2019-11-11 NOTE — PROGRESS NOTES
Assessment/Plan:          Diagnoses and all orders for this visit:    Elevated PSA  Comments:  Again discussed with patient in detail , needs to rule out prostate cancer advised to see Urology  Advised not to delay diagnosis  Orders:  -     PSA, total and free; Future    Blood pressure elevated without history of HTN  Comments:  Blood pressure today is normal    Decreased hemoglobin  -     CBC and differential; Future    Abnormal urine findings  Comments:  Urine is cloudy, Urine culture is negative   advised patient to increase fluid    Bradycardia  Comments:  Stable  Asymptomatic  To call if he developed fatigue, shortness of breath or dizziness    Immunization not carried out because of patient decision  Comments:  Flu shot    Encounter for screening for HIV  Comments:   pt  Orders:  -     HIV 1/2 AG-AB combo; Future            Subjective:     Patient ID: Parvez Carson is a 60 y o  male      Patient is here for follow-up  Elevated PSA patient did not follow with Urology  Patient feels well denied weight loss back pain  bradycardia  Patient denied dizziness, shortness of breath or fatigue  Low hemoglobin  Patient denied rectal and a  Denied dizziness or fatigue  Elevated  Denied flushing, headache or dizzy    Test results  Labs done on November 5, 2019  Discussed result with patient  Patient had,Tdap in 2017      Review of Systems   Constitutional: Negative for activity change, appetite change, chills, fatigue, fever and unexpected weight change  HENT: Negative for congestion, ear discharge, ear pain, hearing loss, nosebleeds, rhinorrhea, sinus pressure, sore throat, tinnitus, trouble swallowing and voice change  Eyes: Negative for photophobia, pain and visual disturbance  Respiratory: Negative for cough, chest tightness, shortness of breath and wheezing  Cardiovascular: Negative for chest pain, palpitations and leg swelling     Gastrointestinal: Negative for abdominal pain, anal bleeding, blood in stool, constipation, diarrhea, nausea and vomiting  Endocrine: Negative for cold intolerance, heat intolerance, polydipsia and polyuria  Genitourinary: Negative for dysuria, frequency, hematuria and urgency  Musculoskeletal: Negative for arthralgias, back pain, gait problem, joint swelling, myalgias and neck pain  Skin: Negative for rash  Neurological: Negative for dizziness, tremors, seizures, syncope, weakness, light-headedness and headaches  Hematological: Negative for adenopathy  Does not bruise/bleed easily  Psychiatric/Behavioral: Negative for agitation, behavioral problems, confusion, dysphoric mood, hallucinations and sleep disturbance  The patient is not nervous/anxious  Objective:     Physical Exam   Constitutional: He is oriented to person, place, and time  He appears well-developed and well-nourished  No distress  HENT:   Head: Normocephalic  Mouth/Throat: Oropharynx is clear and moist  No oropharyngeal exudate  Eyes: Pupils are equal, round, and reactive to light  EOM are normal  No scleral icterus  Neck: Normal range of motion  Neck supple  No JVD present  No tracheal deviation present  Cardiovascular: Normal rate, regular rhythm and normal heart sounds  Exam reveals no gallop and no friction rub  No murmur heard  Pulses:       Carotid pulses are 3+ on the right side, and 3+ on the left side  Legs , no edema    Pulmonary/Chest: Effort normal and breath sounds normal    Abdominal: Soft  Bowel sounds are normal  He exhibits no mass  There is no tenderness  Musculoskeletal: Normal range of motion  He exhibits no edema or tenderness  Lymphadenopathy:     He has no cervical adenopathy  Neurological: He is alert and oriented to person, place, and time  No cranial nerve deficit  He exhibits normal muscle tone  Coordination normal    Normal gait   Skin: No rash noted  Psychiatric: He has a normal mood and affect   His behavior is normal

## 2020-02-06 LAB
BASOPHILS # BLD AUTO: 0 X10E3/UL (ref 0–0.2)
BASOPHILS NFR BLD AUTO: 0 %
EOSINOPHIL # BLD AUTO: 0.1 X10E3/UL (ref 0–0.4)
EOSINOPHIL NFR BLD AUTO: 3 %
ERYTHROCYTE [DISTWIDTH] IN BLOOD BY AUTOMATED COUNT: 13.2 % (ref 11.6–15.4)
HCT VFR BLD AUTO: 38.6 % (ref 37.5–51)
HGB BLD-MCNC: 12.9 G/DL (ref 13–17.7)
HIV 1+2 AB+HIV1 P24 AG SERPL QL IA: NON REACTIVE
IMM GRANULOCYTES # BLD: 0 X10E3/UL (ref 0–0.1)
IMM GRANULOCYTES NFR BLD: 0 %
LYMPHOCYTES # BLD AUTO: 1.2 X10E3/UL (ref 0.7–3.1)
LYMPHOCYTES NFR BLD AUTO: 28 %
MCH RBC QN AUTO: 31.2 PG (ref 26.6–33)
MCHC RBC AUTO-ENTMCNC: 33.4 G/DL (ref 31.5–35.7)
MCV RBC AUTO: 94 FL (ref 79–97)
MONOCYTES # BLD AUTO: 0.4 X10E3/UL (ref 0.1–0.9)
MONOCYTES NFR BLD AUTO: 9 %
NEUTROPHILS # BLD AUTO: 2.7 X10E3/UL (ref 1.4–7)
NEUTROPHILS NFR BLD AUTO: 60 %
PLATELET # BLD AUTO: 228 X10E3/UL (ref 150–450)
PSA FREE MFR SERPL: 14.7 %
PSA FREE SERPL-MCNC: 0.81 NG/ML
PSA SERPL-MCNC: 5.5 NG/ML (ref 0–4)
RBC # BLD AUTO: 4.13 X10E6/UL (ref 4.14–5.8)
WBC # BLD AUTO: 4.5 X10E3/UL (ref 3.4–10.8)

## 2020-02-12 ENCOUNTER — OFFICE VISIT (OUTPATIENT)
Dept: FAMILY MEDICINE CLINIC | Facility: CLINIC | Age: 65
End: 2020-02-12
Payer: COMMERCIAL

## 2020-02-12 VITALS
RESPIRATION RATE: 16 BRPM | HEIGHT: 72 IN | DIASTOLIC BLOOD PRESSURE: 80 MMHG | SYSTOLIC BLOOD PRESSURE: 120 MMHG | BODY MASS INDEX: 21.82 KG/M2 | TEMPERATURE: 96.6 F | WEIGHT: 161.1 LBS | HEART RATE: 60 BPM

## 2020-02-12 DIAGNOSIS — R00.1 BRADYCARDIA: ICD-10-CM

## 2020-02-12 DIAGNOSIS — R71.0 DECREASED HEMOGLOBIN: ICD-10-CM

## 2020-02-12 DIAGNOSIS — R03.0 BLOOD PRESSURE ELEVATED WITHOUT HISTORY OF HTN: ICD-10-CM

## 2020-02-12 DIAGNOSIS — Z28.20 IMMUNIZATION NOT CARRIED OUT BECAUSE OF PATIENT DECISION: ICD-10-CM

## 2020-02-12 DIAGNOSIS — F12.90 MARIJUANA USE: ICD-10-CM

## 2020-02-12 DIAGNOSIS — N40.0 ENLARGED PROSTATE: ICD-10-CM

## 2020-02-12 DIAGNOSIS — R97.20 ELEVATED PSA: Primary | ICD-10-CM

## 2020-02-12 PROCEDURE — 1036F TOBACCO NON-USER: CPT | Performed by: FAMILY MEDICINE

## 2020-02-12 PROCEDURE — 3008F BODY MASS INDEX DOCD: CPT | Performed by: FAMILY MEDICINE

## 2020-02-12 PROCEDURE — 99214 OFFICE O/P EST MOD 30 MIN: CPT | Performed by: FAMILY MEDICINE

## 2020-02-12 NOTE — PATIENT INSTRUCTIONS
Patient to follow up with test results Shonna forgot to take the Cytotec at 2 pm, took it at 4 pm.  Shonna will still be on the end of her menses tomorrow for the IUD insertion.  Reassured that this is the proper timing.  Notified that she does not need to wake up at 2 am to take the second dose of cytotec, to take it at bedtime.   Shonna verbalized understanding of information given.     Message sent to Mulu Ruvalcaba CNP as an FYI

## 2020-02-12 NOTE — PROGRESS NOTES
Assessment/Plan:       No problem-specific Assessment & Plan notes found for this encounter  Diagnoses and all orders for this visit:    Elevated PSA  Comments:  Higher than usual R/O prostate cancer  Orders:  -     Ambulatory referral to Urology; Future    Enlarged prostate    Blood pressure elevated without history of HTN  Comments:  Decrease salt intake    Decreased hemoglobin  -     Occult Blood, Fecal Immunochemical; Future  -     Reticulocytes; Future  -     Haptoglobin; Future  -     Hemoglobin Electrophoresis; Future  -     Vitamin B12; Future  -     Folate; Future  -     Protein electrophoresis, serum; Future  -     Protein electrophoresis, urine    Marijuana use  Comments:  Advised to stop smoking marijuana   Avoid smoking and driving    Bradycardia  Comments:  Stable and asymptomatic    Immunization not carried out because of patient decision  Comments:   The lab flu shot and pneumonia        Patient Instructions   Patient to follow up with test results      Orders Placed This Encounter   Procedures    Occult Blood, Fecal Immunochemical     Standing Status:   Future     Standing Expiration Date:   2/12/2021    Reticulocytes     Standing Status:   Future     Standing Expiration Date:   2/12/2021    Haptoglobin     Standing Status:   Future     Standing Expiration Date:   2/12/2021    Hemoglobin Electrophoresis     Standing Status:   Future     Standing Expiration Date:   2/12/2021    Vitamin B12     Standing Status:   Future     Standing Expiration Date:   2/12/2021    Folate     Standing Status:   Future     Standing Expiration Date:   2/12/2021    Protein electrophoresis, serum     Standing Status:   Future     Standing Expiration Date:   2/12/2021    Protein electrophoresis, urine    Ambulatory referral to Urology     Standing Status:   Future     Standing Expiration Date:   2/12/2021     Referral Priority:   Routine     Referral Type:   Consult - AMB     Referral Reason:   Specialty Services Required     Referred to Provider:   Fabian Roldan MD     Requested Specialty:   Urology     Number of Visits Requested:   1     Expiration Date:   2/12/2021         Subjective:     Patient ID: Ruma Nicholson is a 60 y o  male      Patient is here for his chronic medical problem  Low hemoglobin  Patient denied rectal bleeding, melena  Denied any other bleeding  Denied fatigue shortness of breath or dizziness  Enlarged prostate  Denied problem with urination  Elevated PSA  Was never seen by Urology  Bradycardia  Patient does not have shortness of breath, fatigue or dizziness  Marijuana use patient decreased his used once a week  Denied side effect  Elevated blood pressure  Patient admit to high salt intake  Denied flushing ,headache or dizzy    Test results   Lab done on February 5, 2020  Discussed result with patient      Review of Systems   Constitutional: Negative for activity change, appetite change, chills, fatigue, fever and unexpected weight change  HENT: Negative for congestion, ear discharge, ear pain, hearing loss, nosebleeds, rhinorrhea, sinus pressure, sore throat, tinnitus, trouble swallowing and voice change  Eyes: Negative for photophobia, pain and visual disturbance  Respiratory: Negative for cough, chest tightness, shortness of breath and wheezing  Cardiovascular: Negative for chest pain, palpitations and leg swelling  Gastrointestinal: Negative for abdominal pain, anal bleeding, blood in stool, constipation, diarrhea, nausea and vomiting  Endocrine: Negative for cold intolerance, heat intolerance, polydipsia and polyuria  Genitourinary: Negative for dysuria, frequency, hematuria and urgency  Musculoskeletal: Negative for arthralgias, back pain, gait problem, joint swelling, myalgias and neck pain  Skin: Negative for rash  Neurological: Negative for dizziness, tremors, seizures, syncope, weakness, light-headedness and headaches     Hematological: Negative for adenopathy  Does not bruise/bleed easily  Psychiatric/Behavioral: Negative for agitation, behavioral problems, confusion, dysphoric mood, hallucinations and sleep disturbance  The patient is not nervous/anxious  Objective:     Physical Exam   Constitutional: He is oriented to person, place, and time  He appears well-developed and well-nourished  No distress  HENT:   Head: Normocephalic  Mouth/Throat: Oropharynx is clear and moist  No oropharyngeal exudate  Eyes: Pupils are equal, round, and reactive to light  EOM are normal  Right eye exhibits no discharge  Left eye exhibits no discharge  No scleral icterus  Neck: Normal range of motion  No JVD present  No tracheal deviation present  Cardiovascular: Normal rate, regular rhythm and normal heart sounds  Exam reveals no gallop and no friction rub  No murmur heard  Pulmonary/Chest: Effort normal and breath sounds normal    Abdominal: Soft  Bowel sounds are normal  He exhibits no distension and no mass  There is no tenderness  There is no rebound and no guarding  Musculoskeletal: Normal range of motion  He exhibits no edema or tenderness  Lymphadenopathy:     He has no cervical adenopathy  Neurological: He is alert and oriented to person, place, and time  No cranial nerve deficit or sensory deficit  He exhibits normal muscle tone  Coordination normal    Gait is normal   Skin: No rash noted  Psychiatric: He has a normal mood and affect   His behavior is normal

## 2020-02-13 ENCOUNTER — CONSULT (OUTPATIENT)
Dept: UROLOGY | Facility: MEDICAL CENTER | Age: 65
End: 2020-02-13
Payer: COMMERCIAL

## 2020-02-13 VITALS
SYSTOLIC BLOOD PRESSURE: 122 MMHG | WEIGHT: 158 LBS | BODY MASS INDEX: 20.94 KG/M2 | DIASTOLIC BLOOD PRESSURE: 70 MMHG | HEART RATE: 62 BPM | HEIGHT: 73 IN

## 2020-02-13 DIAGNOSIS — R97.20 ELEVATED PSA: ICD-10-CM

## 2020-02-13 DIAGNOSIS — N13.8 BPH WITH URINARY OBSTRUCTION: Primary | ICD-10-CM

## 2020-02-13 DIAGNOSIS — N40.1 BPH WITH URINARY OBSTRUCTION: Primary | ICD-10-CM

## 2020-02-13 PROCEDURE — 99204 OFFICE O/P NEW MOD 45 MIN: CPT | Performed by: UROLOGY

## 2020-02-13 NOTE — PROGRESS NOTES
HISTORY:    1  Elevated PSA of 5 5, free of 14% in February 2020 September 2019, 4 5  April 2018, 4 7  December 2015, 3 7  July 2014, 2 8  June 2012, 1 6    Minimal BPH symptoms, occasional frequency double voiding but not bothered, rare nocturia  No prior  history  ASSESSMENT / PLAN:    Check PSA again in six weeks  If number still up, will be biopsy  Patient asks about MRI also  The following portions of the patient's history were reviewed and updated as appropriate: allergies, current medications, past family history, past medical history, past social history, past surgical history and problem list     Review of Systems   All other systems reviewed and are negative  Objective:     Physical Exam   Constitutional: He appears well-developed and well-nourished  Genitourinary:   Genitourinary Comments: Penis testes normal    Prostate minimally enlarged, right side bigger than left but no nodule         0   Lab Value Date/Time    PSA 5 5 (H) 02/05/2020 1009    PSA 4 5 (H) 09/13/2019 0815    PSA 4 7 (H) 04/25/2018 1050   ]  BUN   Date Value Ref Range Status   09/13/2019 16 8 - 27 mg/dL Final     Creatinine   Date Value Ref Range Status   09/13/2019 0 83 0 76 - 1 27 mg/dL Final     No components found for: CBC      Patient Active Problem List   Diagnosis    Screening for prostate cancer    Hip pain, left    Left lower quadrant pain    Visual disturbance of one eye    Decreased hemoglobin    Blood pressure elevated without history of HTN    Abnormal urine findings    Elevated PSA    BPH with urinary obstruction        Diagnoses and all orders for this visit:    BPH with urinary obstruction    Elevated PSA  Comments:  Higher than usual R/O prostate cancer  Orders:  -     Ambulatory referral to Urology  -     PSA, total and free; Future           Patient ID: Demetrius Butcher is a 60 y o  male        Current Outpatient Medications:     ascorbic acid (V-R VITAMIN C) 500 MG tablet, Take by mouth, Disp: , Rfl:     Cholecalciferol (VITAMIN D3) 1000 UNIT/SPRAY LIQD, Take by mouth, Disp: , Rfl:     EPINEPHrine (EPIPEN) 0 3 mg/0 3 mL SOAJ, Inject into the shoulder, thigh, or buttocks, Disp: , Rfl:     Misc Natural Products (PROSTATE THERAPY COMPLEX PO), Take by mouth, Disp: , Rfl:     Multiple Vitamins-Minerals (MULTI FOR HIM 50+) TABS, Take by mouth, Disp: , Rfl:     Omega-3 Fatty Acids (FISH OIL) 1,000 mg, Take by mouth, Disp: , Rfl:     Past Medical History:   Diagnosis Date    Esophageal reflux        Past Surgical History:   Procedure Laterality Date    FL INJECTION LEFT HIP (ARTHROGRAM)  9/28/2018    INGUINAL HERNIA REPAIR Left     UMBILICAL HERNIA REPAIR      over age 13   Jacob Barajas [de-identified] TOOTH EXTRACTION         Social History

## 2020-05-06 LAB
PSA FREE MFR SERPL: 14.2 %
PSA FREE SERPL-MCNC: 0.85 NG/ML
PSA SERPL-MCNC: 6 NG/ML (ref 0–4)

## 2020-05-07 ENCOUNTER — TELEPHONE (OUTPATIENT)
Dept: UROLOGY | Facility: MEDICAL CENTER | Age: 65
End: 2020-05-07

## 2020-05-07 DIAGNOSIS — R97.20 ELEVATED PSA: Primary | ICD-10-CM

## 2020-05-13 ENCOUNTER — OFFICE VISIT (OUTPATIENT)
Dept: FAMILY MEDICINE CLINIC | Facility: CLINIC | Age: 65
End: 2020-05-13
Payer: COMMERCIAL

## 2020-05-13 ENCOUNTER — TELEPHONE (OUTPATIENT)
Dept: ADMINISTRATIVE | Facility: OTHER | Age: 65
End: 2020-05-13

## 2020-05-13 VITALS
DIASTOLIC BLOOD PRESSURE: 64 MMHG | HEART RATE: 51 BPM | WEIGHT: 160.6 LBS | OXYGEN SATURATION: 99 % | SYSTOLIC BLOOD PRESSURE: 122 MMHG | TEMPERATURE: 97.7 F | BODY MASS INDEX: 21.29 KG/M2 | HEIGHT: 73 IN

## 2020-05-13 DIAGNOSIS — D64.9 ANEMIA, UNSPECIFIED TYPE: ICD-10-CM

## 2020-05-13 DIAGNOSIS — R97.20 ELEVATED PSA: ICD-10-CM

## 2020-05-13 DIAGNOSIS — R03.0 BLOOD PRESSURE ELEVATED WITHOUT HISTORY OF HTN: Primary | ICD-10-CM

## 2020-05-13 DIAGNOSIS — R00.1 BRADYCARDIA: ICD-10-CM

## 2020-05-13 PROCEDURE — 3008F BODY MASS INDEX DOCD: CPT | Performed by: FAMILY MEDICINE

## 2020-05-13 PROCEDURE — 1036F TOBACCO NON-USER: CPT | Performed by: FAMILY MEDICINE

## 2020-05-13 PROCEDURE — 99214 OFFICE O/P EST MOD 30 MIN: CPT | Performed by: FAMILY MEDICINE

## 2020-05-13 RX ORDER — MULTIVIT WITH MINERALS/LUTEIN
1000 TABLET ORAL DAILY
COMMUNITY

## 2020-05-13 RX ORDER — BIOTIN 1 MG
TABLET ORAL
COMMUNITY

## 2020-05-18 ENCOUNTER — TELEPHONE (OUTPATIENT)
Dept: UROLOGY | Facility: AMBULATORY SURGERY CENTER | Age: 65
End: 2020-05-18

## 2020-06-01 ENCOUNTER — APPOINTMENT (OUTPATIENT)
Dept: RADIOLOGY | Age: 65
End: 2020-06-01
Payer: COMMERCIAL

## 2020-06-01 ENCOUNTER — HOSPITAL ENCOUNTER (OUTPATIENT)
Dept: RADIOLOGY | Age: 65
Discharge: HOME | End: 2020-06-01
Payer: COMMERCIAL

## 2020-06-01 DIAGNOSIS — S00.259A FB (FOREIGN BODY) OF EYELID, UNSPECIFIED LATERALITY: ICD-10-CM

## 2020-06-01 DIAGNOSIS — R97.20 ELEVATED PSA: ICD-10-CM

## 2020-06-01 PROCEDURE — 72197 MRI PELVIS W/O & W/DYE: CPT

## 2020-06-01 PROCEDURE — 76377 3D RENDER W/INTRP POSTPROCES: CPT

## 2020-06-01 PROCEDURE — A9585 GADOBUTROL INJECTION: HCPCS | Performed by: UROLOGY

## 2020-06-01 RX ADMIN — GADOBUTROL 7 ML: 604.72 INJECTION INTRAVENOUS at 15:52

## 2020-06-03 ENCOUNTER — TELEPHONE (OUTPATIENT)
Dept: UROLOGY | Facility: MEDICAL CENTER | Age: 65
End: 2020-06-03

## 2020-06-05 ENCOUNTER — OFFICE VISIT (OUTPATIENT)
Dept: UROLOGY | Facility: MEDICAL CENTER | Age: 65
End: 2020-06-05
Payer: COMMERCIAL

## 2020-06-05 VITALS
BODY MASS INDEX: 20.81 KG/M2 | SYSTOLIC BLOOD PRESSURE: 120 MMHG | WEIGHT: 157 LBS | DIASTOLIC BLOOD PRESSURE: 70 MMHG | HEIGHT: 73 IN | TEMPERATURE: 97.5 F

## 2020-06-05 DIAGNOSIS — Z12.5 SCREENING FOR PROSTATE CANCER: ICD-10-CM

## 2020-06-05 DIAGNOSIS — R97.20 ELEVATED PSA: Primary | ICD-10-CM

## 2020-06-05 PROCEDURE — 99213 OFFICE O/P EST LOW 20 MIN: CPT | Performed by: UROLOGY

## 2020-06-05 PROCEDURE — 1036F TOBACCO NON-USER: CPT | Performed by: UROLOGY

## 2020-06-05 PROCEDURE — 3008F BODY MASS INDEX DOCD: CPT | Performed by: UROLOGY

## 2020-08-07 LAB
BASOPHILS # BLD AUTO: 0 X10E3/UL (ref 0–0.2)
BASOPHILS NFR BLD AUTO: 1 %
EOSINOPHIL # BLD AUTO: 0.1 X10E3/UL (ref 0–0.4)
EOSINOPHIL NFR BLD AUTO: 1 %
ERYTHROCYTE [DISTWIDTH] IN BLOOD BY AUTOMATED COUNT: 12.1 % (ref 11.6–15.4)
HCT VFR BLD AUTO: 40.6 % (ref 37.5–51)
HGB BLD-MCNC: 13.4 G/DL (ref 13–17.7)
IMM GRANULOCYTES # BLD: 0 X10E3/UL (ref 0–0.1)
IMM GRANULOCYTES NFR BLD: 0 %
LYMPHOCYTES # BLD AUTO: 0.9 X10E3/UL (ref 0.7–3.1)
LYMPHOCYTES NFR BLD AUTO: 15 %
MCH RBC QN AUTO: 31.6 PG (ref 26.6–33)
MCHC RBC AUTO-ENTMCNC: 33 G/DL (ref 31.5–35.7)
MCV RBC AUTO: 96 FL (ref 79–97)
MONOCYTES # BLD AUTO: 0.4 X10E3/UL (ref 0.1–0.9)
MONOCYTES NFR BLD AUTO: 7 %
NEUTROPHILS # BLD AUTO: 4.7 X10E3/UL (ref 1.4–7)
NEUTROPHILS NFR BLD AUTO: 76 %
PLATELET # BLD AUTO: 219 X10E3/UL (ref 150–450)
RBC # BLD AUTO: 4.24 X10E6/UL (ref 4.14–5.8)
WBC # BLD AUTO: 6.1 X10E3/UL (ref 3.4–10.8)

## 2020-08-17 ENCOUNTER — OFFICE VISIT (OUTPATIENT)
Dept: FAMILY MEDICINE CLINIC | Facility: CLINIC | Age: 65
End: 2020-08-17
Payer: COMMERCIAL

## 2020-08-17 VITALS
SYSTOLIC BLOOD PRESSURE: 100 MMHG | HEIGHT: 73 IN | BODY MASS INDEX: 20.94 KG/M2 | WEIGHT: 158 LBS | DIASTOLIC BLOOD PRESSURE: 62 MMHG | HEART RATE: 54 BPM | TEMPERATURE: 96.5 F | OXYGEN SATURATION: 94 %

## 2020-08-17 DIAGNOSIS — R97.20 ELEVATED PSA: ICD-10-CM

## 2020-08-17 DIAGNOSIS — D64.9 ANEMIA, UNSPECIFIED TYPE: ICD-10-CM

## 2020-08-17 DIAGNOSIS — Z23 IMMUNIZATION DUE: ICD-10-CM

## 2020-08-17 DIAGNOSIS — K40.90 RIGHT INGUINAL HERNIA: ICD-10-CM

## 2020-08-17 DIAGNOSIS — R03.0 BLOOD PRESSURE ELEVATED WITHOUT HISTORY OF HTN: Primary | ICD-10-CM

## 2020-08-17 DIAGNOSIS — R00.1 BRADYCARDIA: ICD-10-CM

## 2020-08-17 PROCEDURE — 1036F TOBACCO NON-USER: CPT | Performed by: FAMILY MEDICINE

## 2020-08-17 PROCEDURE — 3008F BODY MASS INDEX DOCD: CPT | Performed by: FAMILY MEDICINE

## 2020-08-17 PROCEDURE — 99214 OFFICE O/P EST MOD 30 MIN: CPT | Performed by: FAMILY MEDICINE

## 2020-08-17 NOTE — PROGRESS NOTES
Assessment/Plan:       No problem-specific Assessment & Plan notes found for this encounter  Diagnoses and all orders for this visit:    Blood pressure elevated without history of HTN  Comments:  Pressure is normal today    Anemia, unspecified type  Comments:  resolved    Right inguinal hernia  Comments:  Stable  Patient declined surgery    Bradycardia  Comments:  Stable and asymptomatic    Elevated PSA  Comments:  Dr Trina Neri office visit on June 5, 2020 noted    Immunization due  Comments:  Recommend flu shot next month        There are no Patient Instructions on file for this visit  No orders of the defined types were placed in this encounter  Subjective:     Patient ID: Channing Stuart is a 63 y o  male      HPI  Elevated PSA  Patient stated he had been following with Urology and he had prostate MRI recently  His urologist recommend repeat PSA in few months  Bradycardia  Denied lightheadedness, fatigue or shortness of breath  Anemia  Denied melena or rectal bleeding  Denied fatigue or dizziness  Right inguinal hernia  Stable  Denied pain  Reducible  Elevated blood pressure  Denied headache, flushing or hematuria  Vaping marijuana  Patient stab fat few months ago    Test results  Lab done on May 5th/ 2020 , August 5th 6, 2020  Prostate MRI   discussed with patient    Review of Systems   Constitutional: Negative for appetite change and fatigue  HENT: Negative for ear pain, tinnitus, trouble swallowing and voice change  Eyes: Negative for photophobia, pain and visual disturbance  Respiratory: Negative for cough, chest tightness and wheezing  Cardiovascular: Negative for chest pain, palpitations and leg swelling  Gastrointestinal: Negative for abdominal distention, abdominal pain, anal bleeding, constipation, diarrhea, nausea and rectal pain  Endocrine: Negative for cold intolerance, heat intolerance, polydipsia and polyuria     Genitourinary: Negative for decreased urine volume, difficulty urinating, dysuria, flank pain, frequency, hematuria and urgency  Musculoskeletal: Negative for arthralgias, back pain, gait problem, myalgias and neck pain  Skin: Negative for pallor and rash  Allergic/Immunologic: Negative for immunocompromised state  Neurological: Negative for dizziness, seizures, syncope and speech difficulty  Hematological: Negative for adenopathy  Does not bruise/bleed easily  Psychiatric/Behavioral: Negative for agitation, confusion and hallucinations  The patient is not nervous/anxious  Objective:     Physical Exam  Constitutional:       General: He is not in acute distress  Appearance: Normal appearance  He is well-developed  HENT:      Head: Normocephalic  Eyes:      General: No scleral icterus  Right eye: No discharge  Left eye: No discharge  Conjunctiva/sclera: Conjunctivae normal       Pupils: Pupils are equal, round, and reactive to light  Neck:      Musculoskeletal: Normal range of motion and neck supple  Thyroid: No thyromegaly  Vascular: No carotid bruit or JVD  Cardiovascular:      Rate and Rhythm: Normal rate and regular rhythm  Pulses:           Carotid pulses are 3+ on the right side and 3+ on the left side  Heart sounds: Normal heart sounds  No murmur  No friction rub  No gallop  Comments: Legs , no edema   Pulmonary:      Effort: Pulmonary effort is normal       Breath sounds: Normal breath sounds  Abdominal:      General: Bowel sounds are normal  There is no distension  Palpations: Abdomen is soft  There is no mass  Tenderness: There is no abdominal tenderness  There is no guarding or rebound  Comments: Right inguinal hernia   reducble   Musculoskeletal: Normal range of motion  General: No swelling or tenderness  Right lower leg: No edema  Left lower leg: No edema  Lymphadenopathy:      Cervical: No cervical adenopathy     Skin:     Findings: No rash    Neurological:      General: No focal deficit present  Mental Status: He is alert and oriented to person, place, and time  Cranial Nerves: No cranial nerve deficit  Motor: No weakness or abnormal muscle tone  Coordination: Coordination normal       Gait: Gait normal       Comments: Normal gait   Psychiatric:         Mood and Affect: Mood normal          Behavior: Behavior normal          Thought Content:  Thought content normal          Judgment: Judgment normal

## 2020-11-16 ENCOUNTER — OFFICE VISIT (OUTPATIENT)
Dept: FAMILY MEDICINE CLINIC | Facility: CLINIC | Age: 65
End: 2020-11-16
Payer: COMMERCIAL

## 2020-11-16 VITALS
DIASTOLIC BLOOD PRESSURE: 70 MMHG | SYSTOLIC BLOOD PRESSURE: 112 MMHG | TEMPERATURE: 97.8 F | WEIGHT: 160.4 LBS | BODY MASS INDEX: 21.73 KG/M2 | HEART RATE: 67 BPM | OXYGEN SATURATION: 98 % | HEIGHT: 72 IN

## 2020-11-16 DIAGNOSIS — D64.9 ANEMIA, UNSPECIFIED TYPE: ICD-10-CM

## 2020-11-16 DIAGNOSIS — E78.00 PURE HYPERCHOLESTEROLEMIA: ICD-10-CM

## 2020-11-16 DIAGNOSIS — Z00.00 WELL ADULT EXAM: Primary | ICD-10-CM

## 2020-11-16 DIAGNOSIS — Z28.20 IMMUNIZATION NOT CARRIED OUT BECAUSE OF PATIENT DECISION: ICD-10-CM
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
## 2020-11-16 DIAGNOSIS — K40.90 RIGHT INGUINAL HERNIA: ICD-10-CM

## 2020-11-16 PROCEDURE — 99396 PREV VISIT EST AGE 40-64: CPT | Performed by: FAMILY MEDICINE

## 2020-11-17 PROBLEM — D64.9 ANEMIA: Status: ACTIVE | Noted: 2020-11-17

## 2020-11-17 PROBLEM — Z00.00 WELL ADULT EXAM: Status: ACTIVE | Noted: 2020-11-17

## 2020-11-17 PROBLEM — K40.90 RIGHT INGUINAL HERNIA: Status: ACTIVE | Noted: 2020-11-17
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
## 2020-11-24 LAB
PSA FREE MFR SERPL: 20.8 %
PSA FREE SERPL-MCNC: 1.33 NG/ML
PSA SERPL-MCNC: 6.4 NG/ML (ref 0–4)

## 2020-11-25 LAB
ALBUMIN SERPL-MCNC: 4.3 G/DL (ref 3.8–4.8)
ALBUMIN/GLOB SERPL: 2 {RATIO} (ref 1.2–2.2)
ALP SERPL-CCNC: 88 IU/L (ref 39–117)
ALT SERPL-CCNC: 22 IU/L (ref 0–44)
AST SERPL-CCNC: 19 IU/L (ref 0–40)
BASOPHILS # BLD AUTO: 0 X10E3/UL (ref 0–0.2)
BASOPHILS NFR BLD AUTO: 1 %
BILIRUB SERPL-MCNC: 0.5 MG/DL (ref 0–1.2)
BUN SERPL-MCNC: 14 MG/DL (ref 8–27)
BUN/CREAT SERPL: 16 (ref 10–24)
CALCIUM SERPL-MCNC: 9.4 MG/DL (ref 8.6–10.2)
CHLORIDE SERPL-SCNC: 103 MMOL/L (ref 96–106)
CHOLEST SERPL-MCNC: 174 MG/DL (ref 100–199)
CO2 SERPL-SCNC: 27 MMOL/L (ref 20–29)
CREAT SERPL-MCNC: 0.87 MG/DL (ref 0.76–1.27)
EOSINOPHIL # BLD AUTO: 0.2 X10E3/UL (ref 0–0.4)
EOSINOPHIL NFR BLD AUTO: 4 %
ERYTHROCYTE [DISTWIDTH] IN BLOOD BY AUTOMATED COUNT: 12.5 % (ref 11.6–15.4)
GLOBULIN SER-MCNC: 2.2 G/DL (ref 1.5–4.5)
GLUCOSE SERPL-MCNC: 108 MG/DL (ref 65–99)
HCT VFR BLD AUTO: 39.7 % (ref 37.5–51)
HDLC SERPL-MCNC: 61 MG/DL
HGB BLD-MCNC: 13.1 G/DL (ref 13–17.7)
IMM GRANULOCYTES # BLD: 0 X10E3/UL (ref 0–0.1)
IMM GRANULOCYTES NFR BLD: 0 %
LDLC SERPL CALC-MCNC: 104 MG/DL (ref 0–99)
LYMPHOCYTES # BLD AUTO: 1 X10E3/UL (ref 0.7–3.1)
LYMPHOCYTES NFR BLD AUTO: 23 %
MCH RBC QN AUTO: 31.6 PG (ref 26.6–33)
MCHC RBC AUTO-ENTMCNC: 33 G/DL (ref 31.5–35.7)
MCV RBC AUTO: 96 FL (ref 79–97)
MONOCYTES # BLD AUTO: 0.4 X10E3/UL (ref 0.1–0.9)
MONOCYTES NFR BLD AUTO: 10 %
NEUTROPHILS # BLD AUTO: 2.6 X10E3/UL (ref 1.4–7)
NEUTROPHILS NFR BLD AUTO: 62 %
PLATELET # BLD AUTO: 191 X10E3/UL (ref 150–450)
POTASSIUM SERPL-SCNC: 4.2 MMOL/L (ref 3.5–5.2)
PROT SERPL-MCNC: 6.5 G/DL (ref 6–8.5)
RBC # BLD AUTO: 4.14 X10E6/UL (ref 4.14–5.8)
SL AMB EGFR AFRICAN AMERICAN: 106 ML/MIN/1.73
SL AMB EGFR NON AFRICAN AMERICAN: 92 ML/MIN/1.73
SODIUM SERPL-SCNC: 142 MMOL/L (ref 134–144)
TRIGL SERPL-MCNC: 44 MG/DL (ref 0–149)
WBC # BLD AUTO: 4.2 X10E3/UL (ref 3.4–10.8)

## 2020-12-14 ENCOUNTER — OFFICE VISIT (OUTPATIENT)
Dept: UROLOGY | Facility: MEDICAL CENTER | Age: 65
End: 2020-12-14
Payer: COMMERCIAL

## 2020-12-14 VITALS — HEIGHT: 72 IN | BODY MASS INDEX: 21.81 KG/M2 | WEIGHT: 161 LBS

## 2020-12-14 DIAGNOSIS — R97.20 ELEVATED PROSTATE SPECIFIC ANTIGEN (PSA): Primary | ICD-10-CM

## 2020-12-14 PROCEDURE — 99213 OFFICE O/P EST LOW 20 MIN: CPT | Performed by: UROLOGY

## 2020-12-16 ENCOUNTER — TELEPHONE (OUTPATIENT)
Dept: FAMILY MEDICINE CLINIC | Facility: CLINIC | Age: 65
End: 2020-12-16

## 2020-12-16 DIAGNOSIS — R73.09 ABNORMAL BLOOD SUGAR: Primary | ICD-10-CM

## 2021-04-13 DIAGNOSIS — Z23 ENCOUNTER FOR IMMUNIZATION: ICD-10-CM

## 2021-05-14 LAB
EST. AVERAGE GLUCOSE BLD GHB EST-MCNC: 120 MG/DL
GLUCOSE P FAST SERPL-MCNC: 83 MG/DL (ref 65–99)
HBA1C MFR BLD: 5.8 % (ref 4.8–5.6)

## 2021-05-19 ENCOUNTER — OFFICE VISIT (OUTPATIENT)
Dept: FAMILY MEDICINE CLINIC | Facility: CLINIC | Age: 66
End: 2021-05-19
Payer: COMMERCIAL

## 2021-05-19 VITALS
TEMPERATURE: 97.7 F | HEIGHT: 72 IN | SYSTOLIC BLOOD PRESSURE: 122 MMHG | DIASTOLIC BLOOD PRESSURE: 64 MMHG | HEART RATE: 56 BPM | WEIGHT: 157.4 LBS | BODY MASS INDEX: 21.32 KG/M2 | OXYGEN SATURATION: 99 %
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
## 2021-05-19 DIAGNOSIS — E78.00 ELEVATED LDL CHOLESTEROL LEVEL: ICD-10-CM

## 2021-05-19 DIAGNOSIS — R97.20 ELEVATED PSA: ICD-10-CM

## 2021-05-19 DIAGNOSIS — K40.90 RIGHT INGUINAL HERNIA: ICD-10-CM

## 2021-05-19 DIAGNOSIS — R73.09 ABNORMAL BLOOD SUGAR: Primary | ICD-10-CM

## 2021-05-19 DIAGNOSIS — R03.0 BLOOD PRESSURE ELEVATED WITHOUT HISTORY OF HTN: ICD-10-CM

## 2021-05-19 DIAGNOSIS — R00.1 BRADYCARDIA: ICD-10-CM

## 2021-05-19 PROCEDURE — 99214 OFFICE O/P EST MOD 30 MIN: CPT | Performed by: FAMILY MEDICINE

## 2021-05-19 NOTE — PROGRESS NOTES
Assessment/Plan:       No problem-specific Assessment & Plan notes found for this encounter  Diagnoses and all orders for this visit:    Abnormal blood sugar  Comments:  Watch sweet and carbohydrate intake  Orders:  -     Comprehensive metabolic panel; Future  -     Hemoglobin A1C; Future    Right inguinal hernia  Comments:  Stable per patient and asymptomatic    Tick bite, initial encounter  Comments:  Patient is asymptomatic  recommend to check Lyme titer  He declined    Elevated LDL cholesterol level  Comments:  Watch cholesterol intake  Orders:  -     Lipid Panel with Direct LDL reflex; Future    Blood pressure elevated without history of HTN  Comments:  Blood pressure today is normal    Elevated PSA  Comments:  Encouraged patient to consider prostate biopsy to rule out carcinoma of the prostate  Orders:  -     Comprehensive metabolic panel; Future  -     CBC and differential; Future    Bradycardia  Comments:  Stable and asymptomatic        Patient Instructions   Follow up with test results      Orders Placed This Encounter   Procedures    Comprehensive metabolic panel     This is a patient instruction: Patient fasting for 8 hours or longer recommended  Standing Status:   Future     Standing Expiration Date:   5/19/2022    CBC and differential     This is a patient instruction: This test is non-fasting  Please drink two glasses of water morning of bloodwork  Standing Status:   Future     Standing Expiration Date:   5/19/2022    Hemoglobin A1C     Standing Status:   Future     Standing Expiration Date:   5/19/2022    Lipid Panel with Direct LDL reflex     This is a patient instruction: This test requires patient fasting for 10-12 hours or longer  Drinking of black coffee or black tea is acceptable  Standing Status:   Future     Standing Expiration Date:   5/19/2022         Subjective:     Patient ID: Harriet Walters is a 61 y o  male      HPI  Right inguinal hernia  Same    No abdominal pain   Reducible  Anemia  Denied rectal bleeding or melena  Denied  Tick bite  He has no sign of symptoms of Lyme disease  Elevated PSA  still following with urology he wanted him to have biopsy, patient is reluctant  Bradycardia  Denied shortness of breath fatigue or lightheadedness        Test results  Lab done on May 13, 2021  Discussed with patient  Lyme titer not done    Review of Systems   Constitutional: Negative for activity change, appetite change, chills, fatigue, fever and unexpected weight change  HENT: Negative for congestion, ear discharge, ear pain, hearing loss, nosebleeds, rhinorrhea, sinus pressure, sore throat, tinnitus, trouble swallowing and voice change  Eyes: Negative for photophobia, pain and visual disturbance  Respiratory: Negative for cough, chest tightness, shortness of breath and wheezing  Cardiovascular: Negative for chest pain, palpitations and leg swelling  Gastrointestinal: Negative for abdominal pain, anal bleeding, blood in stool, constipation, diarrhea, nausea and vomiting  Endocrine: Negative for cold intolerance, heat intolerance, polydipsia and polyuria  Genitourinary: Negative for dysuria, frequency, hematuria, penile pain, penile swelling, testicular pain and urgency  Musculoskeletal: Negative for arthralgias, back pain, gait problem, joint swelling, myalgias and neck pain  Skin: Negative for rash  Neurological: Negative for dizziness, tremors, seizures, syncope, weakness, light-headedness and headaches  Hematological: Negative for adenopathy  Does not bruise/bleed easily  Psychiatric/Behavioral: Negative for agitation, behavioral problems, confusion, dysphoric mood, hallucinations and sleep disturbance  The patient is not nervous/anxious  Objective:     Physical Exam  Constitutional:       General: He is not in acute distress  Appearance: Normal appearance  He is well-developed  He is not ill-appearing     HENT:      Head: Normocephalic  Eyes:      General: No scleral icterus  Right eye: No discharge  Left eye: No discharge  Pupils: Pupils are equal, round, and reactive to light  Neck:      Musculoskeletal: Neck supple  Thyroid: No thyromegaly  Vascular: No carotid bruit or JVD  Cardiovascular:      Rate and Rhythm: Normal rate and regular rhythm  Heart sounds: Normal heart sounds  No murmur  No gallop  Pulmonary:      Effort: Pulmonary effort is normal       Breath sounds: Normal breath sounds  Abdominal:      General: Bowel sounds are normal  There is no distension  Palpations: Abdomen is soft  There is no mass  Tenderness: There is no abdominal tenderness  There is no guarding or rebound  Comments: Patient declined to check his hernia   Musculoskeletal: Normal range of motion  General: No swelling or tenderness  Right lower leg: No edema  Left lower leg: No edema  Lymphadenopathy:      Cervical: No cervical adenopathy  Skin:     Findings: No rash  Neurological:      General: No focal deficit present  Mental Status: He is alert and oriented to person, place, and time  Cranial Nerves: No cranial nerve deficit  Motor: No abnormal muscle tone  Coordination: Coordination normal       Gait: Gait normal    Psychiatric:         Mood and Affect: Mood normal          Behavior: Behavior normal          Thought Content:  Thought content normal

## 2021-07-23 LAB — PSA SERPL-MCNC: 7 NG/ML (ref 0–4)

## 2021-07-30 ENCOUNTER — OFFICE VISIT (OUTPATIENT)
Dept: UROLOGY | Facility: MEDICAL CENTER | Age: 66
End: 2021-07-30
Payer: COMMERCIAL

## 2021-07-30 VITALS
BODY MASS INDEX: 20.94 KG/M2 | SYSTOLIC BLOOD PRESSURE: 124 MMHG | HEIGHT: 73 IN | DIASTOLIC BLOOD PRESSURE: 66 MMHG | WEIGHT: 158 LBS

## 2021-07-30 DIAGNOSIS — N40.1 BPH WITH URINARY OBSTRUCTION: Primary | ICD-10-CM

## 2021-07-30 DIAGNOSIS — R97.20 ELEVATED PROSTATE SPECIFIC ANTIGEN (PSA): ICD-10-CM

## 2021-07-30 DIAGNOSIS — N13.8 BPH WITH URINARY OBSTRUCTION: Primary | ICD-10-CM

## 2021-07-30 PROCEDURE — 99214 OFFICE O/P EST MOD 30 MIN: CPT | Performed by: UROLOGY

## 2021-07-30 RX ORDER — CIPROFLOXACIN 750 MG/1
750 TABLET, FILM COATED ORAL EVERY 12 HOURS SCHEDULED
Qty: 2 TABLET | Refills: 0 | Status: SHIPPED | OUTPATIENT
Start: 2021-07-30 | End: 2021-07-31

## 2021-07-30 NOTE — PROGRESS NOTES
HISTORY:    Follow-up for elevated PSA    July 2021, PSA 7 0    December 2020, PSA 6 4 with a free of 20%     June 2020, PSA 6 0    in February 2020, PSA of 5 5, free of 14%   September 2019, 4 5  April 2018, 4 7  December 2015, 3 7  July 2014, 2 8  June 2012, 1 6      Mild BPH, not bothersome        In June 2020,  MRI shows prostate 52 mL in volume   BI-RADS  two, nothing Suspicious for cancer  ASSESSMENT / PLAN:    I recommend prostate biopsy, in office  No findings on MRI to indicate a fusion biopsy would benefit  Patient agreeable and we will schedule biopsy    The following portions of the patient's history were reviewed and updated as appropriate: allergies, current medications, past family history, past medical history, past social history, past surgical history and problem list     Review of Systems   All other systems reviewed and are negative  Objective:     Physical Exam  Constitutional:       General: He is not in acute distress  Appearance: He is well-developed  He is not diaphoretic  HENT:      Head: Normocephalic and atraumatic  Eyes:      General: No scleral icterus  Pulmonary:      Effort: Pulmonary effort is normal    Genitourinary:     Comments: Penis testes normal     Prostate unremarkable  Skin:     Coloration: Skin is not pale  Neurological:      Mental Status: He is alert and oriented to person, place, and time  Psychiatric:         Behavior: Behavior normal          Thought Content:  Thought content normal          Judgment: Judgment normal            0   Lab Value Date/Time    PSA 7 0 (H) 07/23/2021 0927    PSA 6 4 (H) 11/24/2020 0750    PSA 6 0 (H) 05/05/2020 1343   ]  BUN   Date Value Ref Range Status   11/24/2020 14 8 - 27 mg/dL Final     Creatinine   Date Value Ref Range Status   11/24/2020 0 87 0 76 - 1 27 mg/dL Final     No components found for: CBC      Patient Active Problem List   Diagnosis    Screening for prostate cancer    Hip pain, left    Left lower quadrant pain    Visual disturbance of one eye    Decreased hemoglobin    Blood pressure elevated without history of HTN    Abnormal urine findings    Elevated prostate specific antigen (PSA)    BPH with urinary obstruction    Right inguinal hernia    Well adult exam    Anemia    Tick bite        Diagnoses and all orders for this visit:    BPH with urinary obstruction    Elevated prostate specific antigen (PSA)  -     ciprofloxacin (CIPRO) 750 mg tablet; Take 1 tablet (750 mg total) by mouth every 12 (twelve) hours for 1 day Start 2 hours before biopsy           Patient ID: Alessandra Cline is a 61 y o  male        Current Outpatient Medications:     Ascorbic Acid (VITAMIN C) 1000 MG tablet, Take 1,000 mg by mouth daily, Disp: , Rfl:     Cholecalciferol (VITAMIN D3) 25 MCG (1000 UT) CAPS, Take by mouth, Disp: , Rfl:     EPINEPHrine (EPIPEN) 0 3 mg/0 3 mL SOAJ, Inject into the shoulder, thigh, or buttocks, Disp: , Rfl:     Misc Natural Products (PROSTATE THERAPY COMPLEX PO), Take by mouth, Disp: , Rfl:     Multiple Vitamins-Minerals (MULTI FOR HIM 50+) TABS, Take by mouth, Disp: , Rfl:     Nutritional Supplements (DHEA PO), Take by mouth, Disp: , Rfl:     Omega-3 Fatty Acids (FISH OIL) 1,000 mg, Take by mouth, Disp: , Rfl:     Past Medical History:   Diagnosis Date    Esophageal reflux        Past Surgical History:   Procedure Laterality Date    COLONOSCOPY      FL INJECTION LEFT HIP (ARTHROGRAM)  9/28/2018    INGUINAL HERNIA REPAIR Left     UMBILICAL HERNIA REPAIR      over age 13   Reyna Jack [address]TRACTION         Social History

## 2021-08-11 ENCOUNTER — PROCEDURE VISIT (OUTPATIENT)
Dept: UROLOGY | Facility: MEDICAL CENTER | Age: 66
End: 2021-08-11
Payer: COMMERCIAL

## 2021-08-11 DIAGNOSIS — R97.20 ELEVATED PSA: Primary | ICD-10-CM

## 2021-08-11 PROCEDURE — G0416 PROSTATE BIOPSY, ANY MTHD: HCPCS | Performed by: PATHOLOGY

## 2021-08-11 PROCEDURE — 88344 IMHCHEM/IMCYTCHM EA MLT ANTB: CPT | Performed by: PATHOLOGY

## 2021-08-11 PROCEDURE — 96372 THER/PROPH/DIAG INJ SC/IM: CPT

## 2021-08-11 RX ORDER — CEFTRIAXONE 1 G/1
1000 INJECTION, POWDER, FOR SOLUTION INTRAMUSCULAR; INTRAVENOUS ONCE
Status: COMPLETED | OUTPATIENT
Start: 2021-08-11 | End: 2021-08-11

## 2021-08-11 RX ADMIN — CEFTRIAXONE 1000 MG: 1 INJECTION, POWDER, FOR SOLUTION INTRAMUSCULAR; INTRAVENOUS at 12:12

## 2021-08-11 NOTE — PROGRESS NOTES
Biopsy prostate     Date/Time 8/11/2021 11:40 AM     Performed by  Therese Quiroz MD     Authorized by Therese Quiroz MD      Procedure Details   Procedure Notes:   Preop diagnosis:  Elevated PSA    Prostate SHREYA abnormality       Postop diagnosis same    Procedure:  Ultrasound-guided transrectal needle biopsy prostate       The patient had undergone preoperative preparation with Fleet enema and antibiotics as described  Informed consent had been obtained  He was positioned in left lateral decubitus position  The ultrasound probe was gently inserted in the rectum  Ultrasound images of the prostate were obtained in two different planes  1% xylocaine, 5 mL on each side, was used to anesthetize the nerves on each side of the prostate  Prostate volume was measured at  62 mL  Careful examination for any abnormalities showed none  Twelve cores of tissue were taken with the 18 gauge biopsy needle, in the appropriate zones of the prostate  Minimal bleeding was encountered upon removal of the probe  No significant bleeding from the urethra was seen  After appropriate observation, he was accompanied to the bathroom, having tolerated the procedure well

## 2021-08-16 ENCOUNTER — TELEPHONE (OUTPATIENT)
Dept: UROLOGY | Facility: MEDICAL CENTER | Age: 66
End: 2021-08-16

## 2021-08-16 NOTE — TELEPHONE ENCOUNTER
I told patient by phone that his prostate biopsy was benign, no cancer    No troubles with the biopsy, still some scan hematuria, but I reassured him that will pass    Keep our appointment for six

## 2021-11-10 ENCOUNTER — RA CDI HCC (OUTPATIENT)
Dept: OTHER | Facility: HOSPITAL | Age: 66
End: 2021-11-10

## 2021-11-11 LAB
ALBUMIN SERPL-MCNC: 4.7 G/DL (ref 3.8–4.8)
ALBUMIN/GLOB SERPL: 2.1 {RATIO} (ref 1.2–2.2)
ALP SERPL-CCNC: 87 IU/L (ref 44–121)
ALT SERPL-CCNC: 19 IU/L (ref 0–44)
AST SERPL-CCNC: 20 IU/L (ref 0–40)
BASOPHILS # BLD AUTO: 0 X10E3/UL (ref 0–0.2)
BASOPHILS NFR BLD AUTO: 1 %
BILIRUB SERPL-MCNC: 0.9 MG/DL (ref 0–1.2)
BUN SERPL-MCNC: 19 MG/DL (ref 8–27)
BUN/CREAT SERPL: 23 (ref 10–24)
CALCIUM SERPL-MCNC: 9.9 MG/DL (ref 8.6–10.2)
CHLORIDE SERPL-SCNC: 103 MMOL/L (ref 96–106)
CHOLEST SERPL-MCNC: 188 MG/DL (ref 100–199)
CO2 SERPL-SCNC: 25 MMOL/L (ref 20–29)
CREAT SERPL-MCNC: 0.84 MG/DL (ref 0.76–1.27)
EOSINOPHIL # BLD AUTO: 0.1 X10E3/UL (ref 0–0.4)
EOSINOPHIL NFR BLD AUTO: 2 %
ERYTHROCYTE [DISTWIDTH] IN BLOOD BY AUTOMATED COUNT: 12.4 % (ref 11.6–15.4)
GLOBULIN SER-MCNC: 2.2 G/DL (ref 1.5–4.5)
GLUCOSE SERPL-MCNC: 93 MG/DL (ref 65–99)
HBA1C MFR BLD: 5.9 % (ref 4.8–5.6)
HCT VFR BLD AUTO: 41.9 % (ref 37.5–51)
HDLC SERPL-MCNC: 63 MG/DL
HGB BLD-MCNC: 13.9 G/DL (ref 13–17.7)
IMM GRANULOCYTES # BLD: 0 X10E3/UL (ref 0–0.1)
IMM GRANULOCYTES NFR BLD: 0 %
LDLC SERPL CALC-MCNC: 117 MG/DL (ref 0–99)
LYMPHOCYTES # BLD AUTO: 1.1 X10E3/UL (ref 0.7–3.1)
LYMPHOCYTES NFR BLD AUTO: 29 %
MCH RBC QN AUTO: 31.7 PG (ref 26.6–33)
MCHC RBC AUTO-ENTMCNC: 33.2 G/DL (ref 31.5–35.7)
MCV RBC AUTO: 96 FL (ref 79–97)
MONOCYTES # BLD AUTO: 0.4 X10E3/UL (ref 0.1–0.9)
MONOCYTES NFR BLD AUTO: 11 %
NEUTROPHILS # BLD AUTO: 2.3 X10E3/UL (ref 1.4–7)
NEUTROPHILS NFR BLD AUTO: 57 %
PLATELET # BLD AUTO: 207 X10E3/UL (ref 150–450)
POTASSIUM SERPL-SCNC: 5.1 MMOL/L (ref 3.5–5.2)
PROT SERPL-MCNC: 6.9 G/DL (ref 6–8.5)
RBC # BLD AUTO: 4.38 X10E6/UL (ref 4.14–5.8)
SL AMB EGFR AFRICAN AMERICAN: 107 ML/MIN/1.73
SL AMB EGFR NON AFRICAN AMERICAN: 93 ML/MIN/1.73
SODIUM SERPL-SCNC: 140 MMOL/L (ref 134–144)
TRIGL SERPL-MCNC: 42 MG/DL (ref 0–149)
WBC # BLD AUTO: 3.9 X10E3/UL (ref 3.4–10.8)

## 2021-11-16 ENCOUNTER — OFFICE VISIT (OUTPATIENT)
Dept: FAMILY MEDICINE CLINIC | Facility: CLINIC | Age: 66
End: 2021-11-16
Payer: COMMERCIAL

## 2021-11-16 VITALS
WEIGHT: 156.6 LBS | DIASTOLIC BLOOD PRESSURE: 72 MMHG | BODY MASS INDEX: 20.75 KG/M2 | HEART RATE: 51 BPM | TEMPERATURE: 97.3 F | OXYGEN SATURATION: 99 % | HEIGHT: 73 IN | SYSTOLIC BLOOD PRESSURE: 102 MMHG | RESPIRATION RATE: 16 BRPM

## 2021-11-16 DIAGNOSIS — E78.00 ELEVATED LDL CHOLESTEROL LEVEL: ICD-10-CM

## 2021-11-16 DIAGNOSIS — Z71.85 IMMUNIZATION COUNSELING: ICD-10-CM

## 2021-11-16 DIAGNOSIS — R97.20 ELEVATED PSA: ICD-10-CM

## 2021-11-16 DIAGNOSIS — K40.90 RIGHT INGUINAL HERNIA: ICD-10-CM

## 2021-11-16 DIAGNOSIS — Z00.00 WELL ADULT EXAM: Primary | ICD-10-CM

## 2021-11-16 DIAGNOSIS — R73.09 ABNORMAL BLOOD SUGAR: ICD-10-CM

## 2021-11-16 DIAGNOSIS — L98.9 SKIN LESION: ICD-10-CM

## 2021-11-16 DIAGNOSIS — R00.1 BRADYCARDIA: ICD-10-CM

## 2021-11-16 PROCEDURE — 99396 PREV VISIT EST AGE 40-64: CPT | Performed by: FAMILY MEDICINE

## 2021-11-16 PROCEDURE — 3008F BODY MASS INDEX DOCD: CPT | Performed by: FAMILY MEDICINE

## 2022-02-24 ENCOUNTER — OFFICE VISIT (OUTPATIENT)
Dept: UROLOGY | Facility: MEDICAL CENTER | Age: 67
End: 2022-02-24
Payer: COMMERCIAL

## 2022-02-24 VITALS
HEART RATE: 67 BPM | WEIGHT: 159 LBS | SYSTOLIC BLOOD PRESSURE: 100 MMHG | HEIGHT: 73 IN | DIASTOLIC BLOOD PRESSURE: 68 MMHG | BODY MASS INDEX: 21.07 KG/M2

## 2022-02-24 DIAGNOSIS — N40.1 BPH WITH URINARY OBSTRUCTION: ICD-10-CM

## 2022-02-24 DIAGNOSIS — R97.20 ELEVATED PROSTATE SPECIFIC ANTIGEN (PSA): Primary | ICD-10-CM

## 2022-02-24 DIAGNOSIS — N13.8 BPH WITH URINARY OBSTRUCTION: ICD-10-CM

## 2022-02-24 PROCEDURE — 99213 OFFICE O/P EST LOW 20 MIN: CPT | Performed by: UROLOGY

## 2022-02-24 PROCEDURE — 3008F BODY MASS INDEX DOCD: CPT | Performed by: UROLOGY

## 2022-02-24 NOTE — PROGRESS NOTES
HISTORY:    Follow-up BPH and elevated PSA     Prostate biopsy in August 2021, volume 62 mL  Biopsy benign    6 1 in Feb 2021   7 0 in July 2021 6 4 in November 2020  6 0 in May 2020  PSA of 5 5, free of 14% in February 2020 September 2019, 4 5  April 2018, 4 7  December 2015, 3 7  July 2014, 2 8  June 2012, 1 6    Mild BPH symptoms, episodic urgency frequency difficulty starting  Other days, he will have no symptoms  Overall not bothered  ASSESSMENT / PLAN:    PSA a bit lower now than one year ago  Will continue to follow up once per year    The following portions of the patient's history were reviewed and updated as appropriate: allergies, current medications, past family history, past medical history, past social history, past surgical history and problem list     Review of Systems   All other systems reviewed and are negative  Objective:     Physical Exam  Constitutional:       General: He is not in acute distress  Appearance: He is well-developed  He is not diaphoretic  HENT:      Head: Normocephalic and atraumatic  Eyes:      General: No scleral icterus  Pulmonary:      Effort: Pulmonary effort is normal    Skin:     Coloration: Skin is not pale  Neurological:      Mental Status: He is alert and oriented to person, place, and time  Psychiatric:         Behavior: Behavior normal          Thought Content:  Thought content normal          Judgment: Judgment normal            0   Lab Value Date/Time    PSA 7 0 (H) 07/23/2021 0927    PSA 6 4 (H) 11/24/2020 0750    PSA 6 0 (H) 05/05/2020 1343   ]  BUN   Date Value Ref Range Status   11/10/2021 19 8 - 27 mg/dL Final     Creatinine   Date Value Ref Range Status   11/10/2021 0 84 0 76 - 1 27 mg/dL Final     No components found for: CBC      Patient Active Problem List   Diagnosis    Screening for prostate cancer    Hip pain, left    Left lower quadrant pain    Visual disturbance of one eye    Decreased hemoglobin    Blood pressure elevated without history of HTN    Abnormal urine findings    Elevated prostate specific antigen (PSA)    BPH with urinary obstruction    Right inguinal hernia    Well adult exam    Anemia    Tick bite        Diagnoses and all orders for this visit:    Elevated prostate specific antigen (PSA)  -     PSA, total and free; Future    BPH with urinary obstruction           Patient ID: Sixto Kumar is a 61 y o  male        Current Outpatient Medications:     Ascorbic Acid (VITAMIN C) 1000 MG tablet, Take 1,000 mg by mouth daily, Disp: , Rfl:     Cholecalciferol (VITAMIN D3) 25 MCG (1000 UT) CAPS, Take by mouth, Disp: , Rfl:     EPINEPHrine (EPIPEN) 0 3 mg/0 3 mL SOAJ, Inject into the shoulder, thigh, or buttocks, Disp: , Rfl:     Misc Natural Products (PROSTATE THERAPY COMPLEX PO), Take by mouth, Disp: , Rfl:     Multiple Vitamins-Minerals (MULTI FOR HIM 50+) TABS, Take by mouth, Disp: , Rfl:     Nutritional Supplements (DHEA PO), Take by mouth, Disp: , Rfl:     Omega-3 Fatty Acids (FISH OIL) 1,000 mg, Take by mouth, Disp: , Rfl:     Past Medical History:   Diagnosis Date    Esophageal reflux        Past Surgical History:   Procedure Laterality Date    COLONOSCOPY      FL INJECTION LEFT HIP (ARTHROGRAM)  9/28/2018    INGUINAL HERNIA REPAIR Left     UMBILICAL HERNIA REPAIR      over age 13   Emeli Grossman [address] EXTRACTION         Social History

## 2022-05-17 ENCOUNTER — OFFICE VISIT (OUTPATIENT)
Dept: FAMILY MEDICINE CLINIC | Facility: CLINIC | Age: 67
End: 2022-05-17
Payer: COMMERCIAL

## 2022-05-17 VITALS
RESPIRATION RATE: 16 BRPM | BODY MASS INDEX: 21.39 KG/M2 | HEIGHT: 73 IN | HEART RATE: 51 BPM | WEIGHT: 161.4 LBS | OXYGEN SATURATION: 96 % | DIASTOLIC BLOOD PRESSURE: 60 MMHG | TEMPERATURE: 96.8 F | SYSTOLIC BLOOD PRESSURE: 108 MMHG

## 2022-05-17 DIAGNOSIS — Z00.00 MEDICARE WELCOME EXAM: Primary | ICD-10-CM

## 2022-05-17 DIAGNOSIS — Z28.20 IMMUNIZATION NOT CARRIED OUT BECAUSE OF PATIENT DECISION: ICD-10-CM

## 2022-05-17 DIAGNOSIS — E78.00 ELEVATED LDL CHOLESTEROL LEVEL: ICD-10-CM

## 2022-05-17 DIAGNOSIS — R73.09 ABNORMAL BLOOD SUGAR: ICD-10-CM

## 2022-05-17 DIAGNOSIS — R00.1 BRADYCARDIA: ICD-10-CM

## 2022-05-17 PROCEDURE — 1101F PT FALLS ASSESS-DOCD LE1/YR: CPT | Performed by: FAMILY MEDICINE

## 2022-05-17 PROCEDURE — 93000 ELECTROCARDIOGRAM COMPLETE: CPT | Performed by: FAMILY MEDICINE

## 2022-05-17 PROCEDURE — 99214 OFFICE O/P EST MOD 30 MIN: CPT | Performed by: FAMILY MEDICINE

## 2022-05-17 NOTE — PROGRESS NOTES
Assessment/Plan:       No problem-specific Assessment & Plan notes found for this encounter  Diagnoses and all orders for this visit:    Medicare welcome exam  -     POCT ECG    Abnormal blood sugar  Comments: To watch sweet and carbohydrate intake  Orders:  -     Hemoglobin A1C; Future  -     Basic metabolic panel; Future    Elevated LDL cholesterol level  Comments: To follow with 300 mg cholesterol a day  Continue walking  Orders:  -     Lipid Panel with Direct LDL reflex; Future    Bradycardia  Comments:  chronic , stable, asymptomatic    Immunization not carried out because of patient decision  Comments:  prevnar 20 , t dap and ronanx, pt declined        Patient Instructions   Follow-up with test results      Orders Placed This Encounter   Procedures    Lipid Panel with Direct LDL reflex     This is a patient instruction: This test requires patient fasting for 10-12 hours or longer  Drinking of black coffee or black tea is acceptable  Standing Status:   Future     Standing Expiration Date:   5/17/2023    Hemoglobin A1C     Standing Status:   Future     Standing Expiration Date:   5/17/2023    Basic metabolic panel     This is a patient instruction: Patient fasting for 8 hours or longer recommended  Standing Status:   Future     Standing Expiration Date:   5/17/2023    POCT ECG         Subjective:     Patient ID: Cornel Villalta is a 74 y o  male      HPI  Patient is here for follow-up on his chronic medical problem  He feels well and he has no complain  Elevated blood sugar  Denied polyuria or polydipsia  Bradycardia  The patient stated still running 4 miles a day  Denied dizziness fatigue or shortness of breath  Elevated LDL admit to regular the cholesterol intake  No test done since last office visit  Review of Systems   Constitutional: Negative for activity change, appetite change, chills, fatigue, fever and unexpected weight change     HENT: Negative for congestion, ear discharge, ear pain, hearing loss, nosebleeds, rhinorrhea, sinus pressure, sore throat, tinnitus, trouble swallowing and voice change  Eyes: Negative for photophobia, pain and visual disturbance  Respiratory: Negative for cough, chest tightness, shortness of breath and wheezing  Cardiovascular: Negative for chest pain, palpitations and leg swelling  Gastrointestinal: Negative for abdominal pain, anal bleeding, blood in stool, constipation, diarrhea, nausea and vomiting  Endocrine: Negative for cold intolerance, heat intolerance, polydipsia and polyuria  Genitourinary: Negative for dysuria, frequency, hematuria and urgency  Musculoskeletal: Negative for arthralgias, back pain, gait problem, joint swelling, myalgias and neck pain  Skin: Negative for rash  Neurological: Negative for dizziness, tremors, seizures, syncope, weakness, light-headedness and headaches  Hematological: Negative for adenopathy  Does not bruise/bleed easily  Psychiatric/Behavioral: Negative for agitation, behavioral problems, confusion, dysphoric mood, hallucinations and sleep disturbance  The patient is not nervous/anxious  Objective:     Physical Exam  Constitutional:       General: He is not in acute distress  Appearance: Normal appearance  He is well-developed  He is not ill-appearing or diaphoretic  HENT:      Head: Normocephalic  Eyes:      General: No scleral icterus  Right eye: No discharge  Left eye: No discharge  Pupils: Pupils are equal, round, and reactive to light  Neck:      Thyroid: No thyromegaly  Vascular: No carotid bruit or JVD  Cardiovascular:      Rate and Rhythm: Normal rate and regular rhythm  Heart sounds: Normal heart sounds  No murmur heard  No gallop  Pulmonary:      Effort: Pulmonary effort is normal       Breath sounds: Normal breath sounds  Abdominal:      General: Bowel sounds are normal  There is no distension        Palpations: Abdomen is soft  There is no mass  Tenderness: There is no abdominal tenderness  There is no guarding or rebound  Musculoskeletal:         General: No swelling or tenderness  Normal range of motion  Cervical back: Neck supple  Right lower leg: No edema  Left lower leg: No edema  Lymphadenopathy:      Cervical: No cervical adenopathy  Skin:     Findings: No rash  Neurological:      General: No focal deficit present  Mental Status: He is alert and oriented to person, place, and time  Cranial Nerves: No cranial nerve deficit  Motor: No abnormal muscle tone  Coordination: Coordination normal       Gait: Gait normal    Psychiatric:         Mood and Affect: Mood normal          Behavior: Behavior normal          Thought Content:  Thought content normal          Judgment: Judgment normal

## 2022-05-17 NOTE — PROGRESS NOTES
Depression Screening:   PHQ-2 Score: 0      Advance Care Planning:     Advanced directive: No    Advanced directive counseling given: Yes      Cognitive Screening:   Provider or family/friend/caregiver concerned regarding cognition?: No    PREVENTIVE SCREENINGS        Colorectal Cancer Screening:     General: Screening Current      Osteoporosis Screening:    General: Screening Not Indicated      Abdominal Aortic Aneurysm (AAA) Screening:    Risk factors include: age between 73-67 yo        General: Screening Not Indicated    Screening, Brief Intervention, and Referral to Treatment (SBIRT)      Brief Intervention  Drug use counseling given  Other Counseling Topics:   Car/seat belt/driving safety, skin self-exam, sunscreen and calcium and vitamin D intake and regular weightbearing exercise

## 2022-06-17 ENCOUNTER — OFFICE VISIT (OUTPATIENT)
Dept: URGENT CARE | Facility: CLINIC | Age: 67
End: 2022-06-17
Payer: COMMERCIAL

## 2022-06-17 VITALS
RESPIRATION RATE: 18 BRPM | TEMPERATURE: 97.4 F | DIASTOLIC BLOOD PRESSURE: 67 MMHG | OXYGEN SATURATION: 98 % | HEART RATE: 66 BPM | SYSTOLIC BLOOD PRESSURE: 133 MMHG

## 2022-06-17 DIAGNOSIS — R50.9 FEVER AND CHILLS: Primary | ICD-10-CM

## 2022-06-17 DIAGNOSIS — S70.369A TICK BITE OF THIGH, UNSPECIFIED LATERALITY, INITIAL ENCOUNTER: ICD-10-CM
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
## 2022-06-17 PROCEDURE — S9083 URGENT CARE CENTER GLOBAL: HCPCS | Performed by: PHYSICIAN ASSISTANT

## 2022-06-17 PROCEDURE — 99213 OFFICE O/P EST LOW 20 MIN: CPT | Performed by: PHYSICIAN ASSISTANT

## 2022-06-17 RX ORDER — DOXYCYCLINE 100 MG/1
100 TABLET ORAL 2 TIMES DAILY
Qty: 20 TABLET | Refills: 0 | Status: SHIPPED | OUTPATIENT
Start: 2022-06-17 | End: 2022-06-20 | Stop reason: SDUPTHER

## 2022-06-17 NOTE — PATIENT INSTRUCTIONS
Your acute illness may be related to your recent tick bite  Will cover for Lyme disease with antibiotic  Contact your primary care provider as soon as possible to arrange follow-up for the next week  If significant worsening of your symptoms with profound weakness, worst headache of your life, vomiting, chest pain, shortness of breath proceed immediately to emergency room for further evaluation

## 2022-06-17 NOTE — PROGRESS NOTES
[address] [facility]    NAME: April Bhandari is a 74 y o  male  : 1955    MRN: [id number]  DATE: 2022  TIME: 11:29 AM    Assessment and Plan   Fever and chills [R50 9]  1  Fever and chills  doxycycline (ADOXA) 100 MG tablet   2  Tick bite of thigh, unspecified laterality, initial encounter  doxycycline (ADOXA) 100 MG tablet       Patient Instructions   Patient Instructions   Your acute illness may be related to your recent tick bite  Will cover for Lyme disease with antibiotic  Contact your primary care provider as soon as possible to arrange follow-up for the next week  If significant worsening of your symptoms with profound weakness, worst headache of your life, vomiting, chest pain, shortness of breath proceed immediately to emergency room for further evaluation  Chief Complaint     Chief Complaint   Patient presents with    Fever     Patient c/o fever, Headache, and body aches x 3-4 days  He noted a tick bite about 1 week ago  History of Present Illness   April Bhandari presents to the clinic c/o  53-year-old male comes in with fever, chills, body aches and pains, arthralgias, diaphoresis that started within the last couple days  Also headache  Had tick bite approximately 1 week ago on thigh  Tick was engorged  Concerned about Lyme disease  Review of Systems   Review of Systems   Constitutional: Positive for activity change, appetite change, chills, diaphoresis, fatigue and fever  HENT: Negative  Respiratory: Negative  Cardiovascular: Negative  Gastrointestinal: Negative  Skin: Negative for rash  Neurological: Positive for headaches         Current Medications     Long-Term Medications   Medication Sig Dispense Refill    EPINEPHrine (EPIPEN) 0 3 mg/0 3 mL SOAJ Inject into the shoulder, thigh, or buttocks      Multiple Vitamins-Minerals (MULTI FOR HIM 50+) TABS Take by mouth      Omega-3 Fatty Acids (FISH OIL) 1,000 mg Take by mouth         Current Allergies     Allergies as of 06/17/2022 - Reviewed 06/17/2022   Allergen Reaction Noted    Bee venom  04/23/2018          The following portions of the patient's history were reviewed and updated as appropriate: allergies, current medications, past family history, past medical history, past social history, past surgical history and problem list   Past Medical History:   Diagnosis Date    Esophageal reflux      Past Surgical History:   Procedure Laterality Date    COLONOSCOPY      FL INJECTION LEFT HIP (ARTHROGRAM)  9/28/2018    INGUINAL HERNIA REPAIR Left     UMBILICAL HERNIA REPAIR      over age 13   Jud[facility] Spruce WISDOM TOOTH EXTRACTION       Family History   Problem Relation Age of Onset    Hodgkin's lymphoma Mother     No Known Problems Father        Objective   /67   Pulse 66   Temp (!) 97 4 °F (36 3 °C)   Resp 18   SpO2 98%   No LMP for male patient  Physical Exam     Physical Exam  Vitals and nursing note reviewed  Constitutional:       General: He is not in acute distress  Appearance: He is ill-appearing and diaphoretic  He is not toxic-appearing  HENT:      Head: Normocephalic and atraumatic  Right Ear: Tympanic membrane, ear canal and external ear normal       Left Ear: Tympanic membrane, ear canal and external ear normal       Nose: Nose normal  No congestion or rhinorrhea  Mouth/Throat:      Mouth: Mucous membranes are moist       Pharynx: No oropharyngeal exudate or posterior oropharyngeal erythema  Eyes:      General: No scleral icterus  Right eye: No discharge  Left eye: No discharge  Extraocular Movements: Extraocular movements intact  Conjunctiva/sclera: Conjunctivae normal       Pupils: Pupils are equal, round, and reactive to light  Cardiovascular:      Rate and Rhythm: Normal rate and regular rhythm  Heart sounds: Normal heart sounds  No murmur heard  No friction rub  No gallop     Pulmonary:      Effort: Pulmonary effort is normal  No respiratory distress  Breath sounds: Normal breath sounds  No stridor  No wheezing, rhonchi or rales  Abdominal:      Tenderness: There is no abdominal tenderness  There is no guarding or rebound  Comments: Muscular  Nontender to palpation  No obvious hepatosplenomegaly  Musculoskeletal:      Cervical back: Normal range of motion and neck supple  No rigidity or tenderness  Right lower leg: No edema  Left lower leg: No edema  Lymphadenopathy:      Cervical: No cervical adenopathy  Skin:     General: Skin is warm  Coloration: Skin is not jaundiced or pale  Findings: No bruising, erythema or lesion  Neurological:      Mental Status: He is alert and oriented to person, place, and time     Psychiatric:         Mood and Affect: Mood normal          Behavior: Behavior normal

## 2022-06-20 ENCOUNTER — OFFICE VISIT (OUTPATIENT)
Dept: FAMILY MEDICINE CLINIC | Facility: CLINIC | Age: 67
End: 2022-06-20
Payer: COMMERCIAL

## 2022-06-20 VITALS
TEMPERATURE: 97 F | BODY MASS INDEX: 21.02 KG/M2 | HEIGHT: 73 IN | DIASTOLIC BLOOD PRESSURE: 68 MMHG | OXYGEN SATURATION: 96 % | WEIGHT: 158.6 LBS | HEART RATE: 60 BPM | SYSTOLIC BLOOD PRESSURE: 110 MMHG
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
## 2022-06-20 DIAGNOSIS — S70.369A TICK BITE OF THIGH, UNSPECIFIED LATERALITY, INITIAL ENCOUNTER: ICD-10-CM

## 2022-06-20 DIAGNOSIS — R51.9 ACUTE NONINTRACTABLE HEADACHE, UNSPECIFIED HEADACHE TYPE: ICD-10-CM

## 2022-06-20 DIAGNOSIS — M79.10 MYALGIA: ICD-10-CM

## 2022-06-20 DIAGNOSIS — Z00.00 MEDICARE WELCOME EXAM: Primary | ICD-10-CM

## 2022-06-20 DIAGNOSIS — R50.9 FEVER AND CHILLS: ICD-10-CM

## 2022-06-20 DIAGNOSIS — Z71.85 IMMUNIZATION COUNSELING: ICD-10-CM

## 2022-06-20 PROCEDURE — 99213 OFFICE O/P EST LOW 20 MIN: CPT | Performed by: FAMILY MEDICINE

## 2022-06-20 PROCEDURE — 3725F SCREEN DEPRESSION PERFORMED: CPT | Performed by: FAMILY MEDICINE

## 2022-06-20 PROCEDURE — 1003F LEVEL OF ACTIVITY ASSESS: CPT | Performed by: FAMILY MEDICINE

## 2022-06-20 PROCEDURE — 3288F FALL RISK ASSESSMENT DOCD: CPT | Performed by: FAMILY MEDICINE

## 2022-06-20 PROCEDURE — 3008F BODY MASS INDEX DOCD: CPT | Performed by: FAMILY MEDICINE

## 2022-06-20 PROCEDURE — G0402 INITIAL PREVENTIVE EXAM: HCPCS | Performed by: FAMILY MEDICINE

## 2022-06-20 RX ORDER — DOXYCYCLINE 100 MG/1
100 TABLET ORAL 2 TIMES DAILY
Qty: 8 TABLET | Refills: 0 | Status: SHIPPED | OUTPATIENT
Start: 2022-06-27 | End: 2022-07-01

## 2022-06-20 NOTE — PROGRESS NOTES
Assessment and Plan:     Problem List Items Addressed This Visit        Musculoskeletal and Integument    Tick bite    Relevant Medications    doxycycline (ADOXA) 100 MG tablet (Start on 6/27/2022)      Other Visit Diagnoses     Medicare welcome exam    -  Primary    Fever and chills        Relevant Medications    doxycycline (ADOXA) 100 MG tablet (Start on 6/27/2022)    Myalgia        Acute nonintractable headache, unspecified headache type         all above symptoms could be related to lyme disease Vs others  since he had tick bite , will treat pt with  doxycyline x 14 days  to call if any further symp      Immunization counseling        prevnar 20 , and shingrix           Preventive health issues were discussed with patient, and age appropriate screening tests were ordered as noted in patient's After Visit Summary  Personalized health advice and appropriate referrals for health education or preventive services given if needed, as noted in patient's After Visit Summary       History of Present Illness:     Patient presents for a Medicare Wellness Visit    HPI   Patient Care Team:  Sudheer Grajeda MD as PCP - General (Family Medicine)  Gayatri Ann MD (Urology)     Review of Systems:     Review of Systems     Problem List:     Patient Active Problem List   Diagnosis    Screening for prostate cancer    Hip pain, left    Left lower quadrant pain    Visual disturbance of one eye    Decreased hemoglobin    Blood pressure elevated without history of HTN    Abnormal urine findings    Elevated prostate specific antigen (PSA)    BPH with urinary obstruction    Right inguinal hernia    Well adult exam    Anemia    Tick bite      Past Medical and Surgical History:     Past Medical History:   Diagnosis Date    Esophageal reflux      Past Surgical History:   Procedure Laterality Date    COLONOSCOPY      FL INJECTION LEFT HIP (ARTHROGRAM)  9/28/2018    INGUINAL HERNIA REPAIR Left     UMBILICAL HERNIA REPAIR over age 11   Sebastian Tipton [de-identified] TOOTH EXTRACTION        Family History:     Family History   Problem Relation Age of Onset    Hodgkin's lymphoma Mother     No Known Problems Father       Social History:     Social History     Socioeconomic History    Marital status: /Civil Union     Spouse name: None    Number of children: None    Years of education: None    Highest education level: None   Occupational History    None   Tobacco Use    Smoking status: Never Smoker    Smokeless tobacco: Never Used   Vaping Use    Vaping Use: Never used   Substance and Sexual Activity    Alcohol use: Yes     Comment: 2-3 beer on the weekend    Drug use: Not Currently     Types: Marijuana     Comment:  vaping     Sexual activity: Yes     Comment: Stop vaping marijuana since February this year   Other Topics Concern    None   Social History Narrative    Lives with spouse      Social Determinants of Health     Financial Resource Strain: Not on file   Food Insecurity: Not on file   Transportation Needs: Not on file   Physical Activity: Not on file   Stress: Not on file   Social Connections: Not on file   Intimate Partner Violence: Not on file   Housing Stability: Not on file      Medications and Allergies:     Current Outpatient Medications   Medication Sig Dispense Refill    [START ON 6/27/2022] doxycycline (ADOXA) 100 MG tablet Take 1 tablet (100 mg total) by mouth 2 (two) times a day for 4 days 8 tablet 0    Ascorbic Acid (VITAMIN C) 1000 MG tablet Take 1,000 mg by mouth daily (Patient not taking: Reported on 6/20/2022)      Cholecalciferol (VITAMIN D3) 25 MCG (1000 UT) CAPS Take by mouth (Patient not taking: Reported on 6/20/2022)      EPINEPHrine (EPIPEN) 0 3 mg/0 3 mL SOAJ Inject into the shoulder, thigh, or buttocks (Patient not taking: Reported on 6/20/2022)      Misc Natural Products (PROSTATE THERAPY COMPLEX PO) Take by mouth (Patient not taking: Reported on 6/20/2022)      Nutritional Supplements (DHEA PO) Take by mouth (Patient not taking: Reported on 6/20/2022)      Omega-3 Fatty Acids (FISH OIL) 1,000 mg Take by mouth (Patient not taking: Reported on 6/20/2022)       No current facility-administered medications for this visit  Allergies   Allergen Reactions    Bee Venom       Immunizations:     Immunization History   Administered Date(s) Administered    COVID-19 PFIZER VACCINE 0 3 ML IM 04/29/2021, 05/20/2021, 12/03/2021    Td (adult), Unspecified 12/19/2017    Td (adult), adsorbed 12/19/2017      Health Maintenance:         Topic Date Due    Colorectal Cancer Screening  06/01/2025    HIV Screening  Completed    Hepatitis C Screening  Completed         Topic Date Due    DTaP,Tdap,and Td Vaccines (1 - Tdap) 12/20/2017    COVID-19 Vaccine (4 - Booster for Pfizer series) 04/03/2022    Pneumococcal Vaccine: 65+ Years (1 - PCV) Never done    Influenza Vaccine (Season Ended) 09/01/2022      Medicare Screening Tests and Risk Assessments:     Rosario Núñez is here for his Welcome to Medicare visit  Health Risk Assessment:   Patient rates overall health as very good  Patient feels that their physical health rating is slightly better  Patient is very satisfied with their life  Eyesight was rated as same  Hearing was rated as same  Patient feels that their emotional and mental health rating is same  Patients states they are never, rarely angry  Patient states they are never, rarely unusually tired/fatigued  Pain experienced in the last 7 days has been some  Patient's pain rating has been 3/10  headache headache and myalgia    Depression Screening:   PHQ-2 Score: 0      Fall Risk Screening: In the past year, patient has experienced: no history of falling in past year      Home Safety:  Patient does not have trouble with stairs inside or outside of their home  Patient has working smoke alarms and has working carbon monoxide detector  Home safety hazards include: none  Nutrition:   Current diet is Regular  Medications:   Patient is currently taking over-the-counter supplements  OTC medications include: see medication list  Patient is able to manage medications  Activities of Daily Living (ADLs)/Instrumental Activities of Daily Living (IADLs):   Walk and transfer into and out of bed and chair?: Yes  Dress and groom yourself?: Yes    Bathe or shower yourself?: Yes    Feed yourself? Yes  Do your laundry/housekeeping?: Yes  Manage your money, pay your bills and track your expenses?: Yes  Make your own meals?: Yes    Do your own shopping?: Yes    Advance Care Planning:   Living will: No    Advanced directive: No    Advanced directive counseling given: Yes    Five wishes given: Yes      Comments: Patient was given 5 wishes on May 17    Cognitive Screening:   Provider or family/friend/caregiver concerned regarding cognition?: No    PREVENTIVE SCREENINGS      Cardiovascular Screening:    General: Screening Current      Diabetes Screening:     General: Screening Current      Colorectal Cancer Screening:     General: Screening Current      Prostate Cancer Screening:    General: Screening Current      Osteoporosis Screening:    General: Screening Not Indicated      Abdominal Aortic Aneurysm (AAA) Screening:    Risk factors include: age between 73-69 yo        General: Screening Not Indicated      Lung Cancer Screening:     General: Screening Not Indicated      Hepatitis C Screening:    General: Screening Current    Screening, Brief Intervention, and Referral to Treatment (SBIRT)    Screening  Typical number of drinks in a day: 1  Typical number of drinks in a week: 5  Interpretation: Low risk drinking behavior      Single Item Drug Screening:  How often have you used an illegal drug (including marijuana) or a prescription medication for non-medical reasons in the past year? never    Single Item Drug Screen Score: 0  Interpretation: Negative screen for possible drug use disorder    Other Counseling Topics:   Car/seat belt/driving safety, skin self-exam, sunscreen and regular weightbearing exercise and calcium and vitamin D intake  Visual Acuity Screening    Right eye Left eye Both eyes   Without correction:      With correction: 20/20 20/20 20/20   Hearing Screening Comments:  Whisper test normal  B/l     Physical Exam:     /68 (BP Location: Left arm, Patient Position: Sitting, Cuff Size: Adult)   Pulse 60   Temp (!) 97 °F (36 1 °C) (Temporal)   Ht 6' 1" (1 854 m)   Wt 71 9 kg (158 lb 9 6 oz)   SpO2 96%   BMI 20 92 kg/m²     Physical Exam     Óscar Law MD

## 2022-06-20 NOTE — PROGRESS NOTES
Assessment/Plan:       No problem-specific Assessment & Plan notes found for this encounter  Diagnoses and all orders for this visit:    Medicare welcome exam    Tick bite of thigh, unspecified laterality, initial encounter  -     doxycycline (ADOXA) 100 MG tablet; Take 1 tablet (100 mg total) by mouth 2 (two) times a day for 4 days    Fever and chills  -     doxycycline (ADOXA) 100 MG tablet; Take 1 tablet (100 mg total) by mouth 2 (two) times a day for 4 days    Myalgia    Acute nonintractable headache, unspecified headache type  Comments:   all above symptoms could be related to lyme disease Vs others  since he had tick bite , will treat pt with  doxycyline x 14 days  to call if any further symp      Immunization counseling  Comments:  prevnar 20 , and shingrix        There are no Patient Instructions on file for this visit  No orders of the defined types were placed in this encounter  Subjective:     Patient ID: Parvez Carson is a 74 y o  male      [facility]   Acute care  Patient complained off  Body ache , arhtralgia , headache , chills  fever 101, one week ago  No rash   2 weeks ago he pulled a tick from left thigh  Had Covid test at home , seen at urgent care ,was placed on doxycycline   After he took it x 1-2 days, all symptoms resolved  Urgent care visit on June 17, 2022 noted  Patient had Medicare wellness exam on May 17  There was a defect in the template , and was not billed to medicare  EKG done  Patient stated he used to smoke marijuana the but the last time he smoked was 2020  Review of Systems   Constitutional: Negative for appetite change, chills, diaphoresis and fatigue  HENT: Negative for ear pain, tinnitus, trouble swallowing and voice change  Eyes: Negative for photophobia, pain and visual disturbance  Respiratory: Negative for cough, chest tightness and wheezing  Cardiovascular: Negative for chest pain, palpitations and leg swelling     Gastrointestinal: Negative for abdominal distention, abdominal pain, anal bleeding, constipation, diarrhea, nausea and rectal pain  Endocrine: Negative for cold intolerance, heat intolerance, polydipsia and polyuria  Genitourinary: Negative for decreased urine volume, difficulty urinating, dysuria, flank pain, frequency, hematuria and urgency  Musculoskeletal: Negative for back pain, gait problem, joint swelling and neck pain  Skin: Negative for pallor and rash  Allergic/Immunologic: Negative for immunocompromised state  Neurological: Negative for dizziness, tremors, seizures, syncope, speech difficulty, weakness and light-headedness  Hematological: Negative for adenopathy  Does not bruise/bleed easily  Psychiatric/Behavioral: Negative for agitation, confusion and hallucinations  The patient is not nervous/anxious  Objective:     Physical Exam  Constitutional:       General: He is not in acute distress  Appearance: Normal appearance  He is well-developed  HENT:      Head: Normocephalic  Eyes:      General: No scleral icterus  Right eye: No discharge  Left eye: No discharge  Pupils: Pupils are equal, round, and reactive to light  Neck:      Thyroid: No thyromegaly  Vascular: No carotid bruit or JVD  Cardiovascular:      Rate and Rhythm: Normal rate and regular rhythm  Heart sounds: Normal heart sounds  No murmur heard  No gallop  Pulmonary:      Effort: Pulmonary effort is normal       Breath sounds: Normal breath sounds  Abdominal:      General: Bowel sounds are normal  There is no distension  Palpations: Abdomen is soft  There is no mass  Tenderness: There is no abdominal tenderness  There is no guarding or rebound  Musculoskeletal:         General: No swelling or tenderness  Normal range of motion  Cervical back: Neck supple  Right lower leg: No edema  Left lower leg: No edema  Lymphadenopathy:      Cervical: No cervical adenopathy     Skin: Coloration: Skin is not jaundiced  Findings: No bruising, erythema or rash  Neurological:      General: No focal deficit present  Mental Status: He is alert and oriented to person, place, and time  Cranial Nerves: No cranial nerve deficit  Motor: No abnormal muscle tone  Coordination: Coordination normal       Gait: Gait normal    Psychiatric:         Mood and Affect: Mood normal          Behavior: Behavior normal          Thought Content:  Thought content normal

## 2022-10-11 PROBLEM — R50.9 FEVER AND CHILLS: Status: RESOLVED | Noted: 2022-06-20 | Resolved: 2022-10-11

## 2022-10-11 PROBLEM — Z00.00 MEDICARE WELCOME EXAM: Status: RESOLVED | Noted: 2022-06-20 | Resolved: 2022-10-11

## 2022-11-12 LAB
BUN SERPL-MCNC: 13 MG/DL (ref 8–27)
BUN/CREAT SERPL: 17 (ref 10–24)
CALCIUM SERPL-MCNC: 9.6 MG/DL (ref 8.6–10.2)
CHLORIDE SERPL-SCNC: 103 MMOL/L (ref 96–106)
CHOLEST SERPL-MCNC: 188 MG/DL (ref 100–199)
CO2 SERPL-SCNC: 25 MMOL/L (ref 20–29)
CREAT SERPL-MCNC: 0.77 MG/DL (ref 0.76–1.27)
EGFR: 99 ML/MIN/1.73
GLUCOSE SERPL-MCNC: 99 MG/DL (ref 70–99)
HBA1C MFR BLD: 5.9 % (ref 4.8–5.6)
HDLC SERPL-MCNC: 64 MG/DL
LDLC SERPL CALC-MCNC: 114 MG/DL (ref 0–99)
LDLC/HDLC SERPL: 1.8 RATIO (ref 0–3.6)
POTASSIUM SERPL-SCNC: 4.5 MMOL/L (ref 3.5–5.2)
SL AMB VLDL CHOLESTEROL CALC: 10 MG/DL (ref 5–40)
SODIUM SERPL-SCNC: 141 MMOL/L (ref 134–144)
TRIGL SERPL-MCNC: 53 MG/DL (ref 0–149)

## 2022-11-18 ENCOUNTER — RA CDI HCC (OUTPATIENT)
Dept: OTHER | Facility: HOSPITAL | Age: 67
End: 2022-11-18

## 2022-11-18 NOTE — PROGRESS NOTES
[facility]  coding opportunities       Chart reviewed, no opportunity found:   [address]        Patients Insurance     Medicare Insurance: [facility]

## 2022-11-22 ENCOUNTER — OFFICE VISIT (OUTPATIENT)
Dept: FAMILY MEDICINE CLINIC | Facility: CLINIC | Age: 67
End: 2022-11-22

## 2022-11-22 VITALS
HEIGHT: 73 IN | RESPIRATION RATE: 16 BRPM | OXYGEN SATURATION: 98 % | WEIGHT: 159 LBS | DIASTOLIC BLOOD PRESSURE: 68 MMHG | BODY MASS INDEX: 21.07 KG/M2 | HEART RATE: 58 BPM | SYSTOLIC BLOOD PRESSURE: 112 MMHG | TEMPERATURE: 97.3 F

## 2022-11-22 DIAGNOSIS — Z71.85 IMMUNIZATION COUNSELING: ICD-10-CM

## 2022-11-22 DIAGNOSIS — K40.90 RIGHT INGUINAL HERNIA: ICD-10-CM

## 2022-11-22 DIAGNOSIS — E78.00 ELEVATED LDL CHOLESTEROL LEVEL: ICD-10-CM

## 2022-11-22 DIAGNOSIS — R97.20 ELEVATED PSA: ICD-10-CM

## 2022-11-22 DIAGNOSIS — R73.09 ABNORMAL BLOOD SUGAR: Primary | ICD-10-CM

## 2022-11-22 DIAGNOSIS — R00.1 BRADYCARDIA: ICD-10-CM

## 2022-11-25 PROBLEM — E78.00 ELEVATED LDL CHOLESTEROL LEVEL: Status: ACTIVE | Noted: 2022-11-25

## 2022-11-25 PROBLEM — Z71.85 IMMUNIZATION COUNSELING: Status: ACTIVE | Noted: 2022-11-25

## 2022-11-25 PROBLEM — R00.1 BRADYCARDIA: Status: ACTIVE | Noted: 2022-11-25

## 2022-11-25 PROBLEM — R97.20 ELEVATED PSA: Status: ACTIVE | Noted: 2022-11-25

## 2022-11-25 PROBLEM — R73.09 ABNORMAL BLOOD SUGAR: Status: ACTIVE | Noted: 2022-11-25

## 2023-05-16 ENCOUNTER — TELEPHONE (OUTPATIENT)
Dept: FAMILY MEDICINE CLINIC | Facility: CLINIC | Age: 68
End: 2023-05-16

## 2023-05-16 NOTE — TELEPHONE ENCOUNTER
I left a message for patient to call me back it looks like his future apt was for a medicare wellness and it was reschedule from when I originally posted the apt for  We can see him on 05/18 but we can not do the AWV, since he is not due until 06/20  If he wants to change his apt until 06/20 or after we can also arrange this for him

## 2023-06-21 ENCOUNTER — OFFICE VISIT (OUTPATIENT)
Dept: FAMILY MEDICINE CLINIC | Facility: CLINIC | Age: 68
End: 2023-06-21
Payer: COMMERCIAL

## 2023-06-21 VITALS
DIASTOLIC BLOOD PRESSURE: 60 MMHG | OXYGEN SATURATION: 99 % | SYSTOLIC BLOOD PRESSURE: 102 MMHG | BODY MASS INDEX: 19.96 KG/M2 | WEIGHT: 150.6 LBS | HEIGHT: 73 IN | HEART RATE: 57 BPM | TEMPERATURE: 97.3 F

## 2023-06-21 DIAGNOSIS — E44.1 MILD PROTEIN-CALORIE MALNUTRITION (HCC): ICD-10-CM

## 2023-06-21 DIAGNOSIS — Z00.00 MEDICARE ANNUAL WELLNESS VISIT, SUBSEQUENT: Primary | ICD-10-CM

## 2023-06-21 DIAGNOSIS — R97.20 ELEVATED PSA: ICD-10-CM

## 2023-06-21 DIAGNOSIS — R63.4 WEIGHT LOSS: ICD-10-CM

## 2023-06-21 DIAGNOSIS — E55.9 VITAMIN D DEFICIENCY: ICD-10-CM

## 2023-06-21 DIAGNOSIS — R73.03 PREDIABETES: ICD-10-CM

## 2023-06-21 PROBLEM — M79.10 MYALGIA: Status: RESOLVED | Noted: 2022-06-20 | Resolved: 2023-06-21

## 2023-06-21 PROBLEM — R10.32 LEFT LOWER QUADRANT PAIN: Status: RESOLVED | Noted: 2018-04-23 | Resolved: 2023-06-21

## 2023-06-21 PROBLEM — H53.9 VISUAL DISTURBANCE OF ONE EYE: Status: RESOLVED | Noted: 2018-04-23 | Resolved: 2023-06-21

## 2023-06-21 PROBLEM — R03.0 BLOOD PRESSURE ELEVATED WITHOUT HISTORY OF HTN: Status: RESOLVED | Noted: 2019-09-30 | Resolved: 2023-06-21

## 2023-06-21 PROBLEM — Z12.5 SCREENING FOR PROSTATE CANCER: Status: RESOLVED | Noted: 2018-04-23 | Resolved: 2023-06-21

## 2023-06-21 PROBLEM — R73.09 ABNORMAL BLOOD SUGAR: Status: RESOLVED | Noted: 2022-11-25 | Resolved: 2023-06-21

## 2023-06-21 PROBLEM — R51.9 ACUTE NONINTRACTABLE HEADACHE: Status: RESOLVED | Noted: 2022-06-20 | Resolved: 2023-06-21

## 2023-06-21 PROBLEM — R82.90 ABNORMAL URINE FINDINGS: Status: RESOLVED | Noted: 2019-09-30 | Resolved: 2023-06-21

## 2023-06-21 PROBLEM — R71.0 DECREASED HEMOGLOBIN: Status: RESOLVED | Noted: 2019-09-30 | Resolved: 2023-06-21

## 2023-06-21 PROBLEM — M25.552 HIP PAIN, LEFT: Status: RESOLVED | Noted: 2018-04-23 | Resolved: 2023-06-21

## 2023-06-21 PROBLEM — Z71.85 IMMUNIZATION COUNSELING: Status: RESOLVED | Noted: 2022-11-25 | Resolved: 2023-06-21
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
## 2023-06-21 PROCEDURE — 99214 OFFICE O/P EST MOD 30 MIN: CPT | Performed by: FAMILY MEDICINE

## 2023-06-21 PROCEDURE — G0439 PPPS, SUBSEQ VISIT: HCPCS | Performed by: FAMILY MEDICINE

## 2023-06-21 NOTE — PROGRESS NOTES
Assessment and Plan:     Problem List Items Addressed This Visit        Other    Elevated PSA    Relevant Orders    PSA Total, Diagnostic    Mild protein-calorie malnutrition ([address] )    Prediabetes    Relevant Orders    HEMOGLOBIN A1C W/ EAG ESTIMATION    Lipid Panel with Direct LDL reflex    Vitamin D deficiency    Relevant Orders    Vitamin D 25 hydroxy   Other Visit Diagnoses     Medicare annual wellness visit, subsequent    -  Primary    Weight loss        Relevant Orders    CBC and differential    Comprehensive metabolic panel    UA w Reflex to Microscopic w Reflex to Culture -Lab Collect    TSH, 3rd generation with Free T4 reflex          - Patient noted to have unintentional weight loss of 9lbs in the past 6 months  Will order repeat PSA given elevated PSA in the past as well as CBC, CMP, TSH and UA  Patient will follow up in 4 weeks for a weight recheck    - Repeat A1C and lipid panel ordered given prediabetes   - Repeat Vitamin D ordered; continue vitamin D 1000 units daily     Preventive health issues were discussed with patient, and age appropriate screening tests were ordered as noted in patient's After Visit Summary  Personalized health advice and appropriate referrals for health education or preventive services given if needed, as noted in patient's After Visit Summary  History of Present Illness:     RONNY Mazariegos is a very pleasant 79year old male who presents today for a Medicare Wellness Visit  Overall he feels well and endorses no complaints at this time  He was seeing Urology for an elevated PSA and underwent a prostate biopsy last year which was negative and has not seen them since that time  He states that he recently got a part time job which involves a lot of walking but he has cut down on running  He was surprised to find out that he had lost 9lbs since last office visit in November   He notes that this was unintentional  He denies any fever, chills, night sweat, appetite changes etc      Patient Care Team:  Denise Kowalski MD as PCP - General (Family Medicine)  Trinidad Severs, MD (Urology)     Review of Systems:     Review of Systems   Constitutional: Positive for unexpected weight change  Negative for activity change, appetite change, chills, fatigue and fever  HENT: Negative  Eyes: Negative  Respiratory: Negative  Cardiovascular: Negative  Gastrointestinal: Negative  Musculoskeletal: Positive for arthralgias  Skin: Negative  Neurological: Negative  Psychiatric/Behavioral: Negative           Problem List:     Patient Active Problem List   Diagnosis   • BPH with urinary obstruction   • Right inguinal hernia   • Anemia   • Elevated LDL cholesterol level   • Bradycardia   • Elevated PSA   • Mild protein-calorie malnutrition (HCC)   • Prediabetes   • Vitamin D deficiency      Past Medical and Surgical History:     Past Medical History:   Diagnosis Date   • Esophageal reflux      Past Surgical History:   Procedure Laterality Date   • COLONOSCOPY     • FL INJECTION LEFT HIP (ARTHROGRAM)  9/28/2018   • INGUINAL HERNIA REPAIR Left    • UMBILICAL HERNIA REPAIR      over age 13   • [de-identified] TOOTH EXTRACTION        Family History:     Family History   Problem Relation Age of Onset   • Hodgkin's lymphoma Mother    • No Known Problems Father       Social History:     Social History     Socioeconomic History   • Marital status: /Civil Union     Spouse name: Not on file   • Number of children: Not on file   • Years of education: Not on file   • Highest education level: Not on file   Occupational History   • Not on file   Tobacco Use   • Smoking status: Never   • Smokeless tobacco: Never   Vaping Use   • Vaping Use: Never used   Substance and Sexual Activity   • Alcohol use: Yes     Comment: 2-3 beer on the weekend   • Drug use: Not Currently     Types: Marijuana     Comment:  vaping    • Sexual activity: Yes     Comment: Stop vaping marijuana since February this year Other Topics Concern   • Not on file   Social History Narrative    Lives with spouse      Social Determinants of Health     Financial Resource Strain: Low Risk  (6/15/2023)    Overall Financial Resource Strain (CARDIA)    • Difficulty of Paying Living Expenses: Not hard at all   Food Insecurity: Not on file   Transportation Needs: No Transportation Needs (6/15/2023)    PRAPARE - Transportation    • Lack of Transportation (Medical): No    • Lack of Transportation (Non-Medical): No   Physical Activity: Not on file   Stress: Not on file   Social Connections: Not on file   Intimate Partner Violence: Not on file   Housing Stability: Not on file      Medications and Allergies:     Current Outpatient Medications   Medication Sig Dispense Refill   • Ascorbic Acid (VITAMIN C) 1000 MG tablet Take 1,000 mg by mouth daily     • Cholecalciferol (VITAMIN D3) 25 MCG (1000 UT) CAPS Take by mouth     • EPINEPHrine (EPIPEN) 0 3 mg/0 3 mL SOAJ Inject into a muscle     • Misc Natural Products (PROSTATE THERAPY COMPLEX PO) Take by mouth     • Nutritional Supplements (DHEA PO) Take by mouth     • Omega-3 Fatty Acids (FISH OIL) 1,000 mg Take by mouth       No current facility-administered medications for this visit  Allergies   Allergen Reactions   • Bee Venom       Immunizations:     Immunization History   Administered Date(s) Administered   • COVID-19 PFIZER VACCINE 0 3 ML IM 04/29/2021, 05/20/2021, 12/03/2021   • Td (adult), Unspecified 12/19/2017   • Td (adult), adsorbed 12/19/2017      Health Maintenance:         Topic Date Due   • Colorectal Cancer Screening  06/01/2025   • Hepatitis C Screening  Completed         Topic Date Due   • COVID-19 Vaccine (4 - Pfizer series) 01/28/2022   • Pneumococcal Vaccine: 65+ Years (1 - PCV) Never done   • Influenza Vaccine (Season Ended) 09/01/2023      Medicare Screening Tests and Risk Assessments:     Yaima Kemp is here for his Subsequent Wellness visit       Health Risk Assessment:   Patient rates overall health as excellent  Patient feels that their physical health rating is much better  Patient is very satisfied with their life  Eyesight was rated as same  Hearing was rated as same  Patient feels that their emotional and mental health rating is same  Patients states they are never, rarely angry  Patient states they are sometimes unusually tired/fatigued  Pain experienced in the last 7 days has been some  Patient's pain rating has been 2/10  Patient states that he has experienced weight loss or gain in last 6 months  Fall Risk Screening: In the past year, patient has experienced: no history of falling in past year      Home Safety:  Patient does not have trouble with stairs inside or outside of their home  Patient has working smoke alarms and has working carbon monoxide detector  Home safety hazards include: none  Nutrition:   Current diet is Regular  Medications:   Patient is currently taking over-the-counter supplements  OTC medications include: there listed  Patient is able to manage medications  Activities of Daily Living (ADLs)/Instrumental Activities of Daily Living (IADLs):   Walk and transfer into and out of bed and chair?: Yes  Dress and groom yourself?: Yes    Bathe or shower yourself?: Yes    Feed yourself?  Yes  Do your laundry/housekeeping?: Yes  Manage your money, pay your bills and track your expenses?: Yes  Make your own meals?: Yes    Do your own shopping?: Yes    Previous Hospitalizations:   Any hospitalizations or ED visits within the last 12 months?: No      Advance Care Planning:   Living will: No    Durable POA for healthcare: No    Advanced directive: No    Five wishes given: Yes      PREVENTIVE SCREENINGS      Cardiovascular Screening:    General: Screening Current      Diabetes Screening:     General: Screening Current      Colorectal Cancer Screening:     General: Screening Current      Abdominal Aortic Aneurysm (AAA) Screening:    Risk factors include: age "between 73-69 yo        Lung Cancer Screening:     General: Screening Not Indicated      Hepatitis C Screening:    General: Screening Current    Screening, Brief Intervention, and Referral to Treatment (SBIRT)    Screening  Typical number of drinks in a day: 1  Typical number of drinks in a week: 5  Interpretation: Low risk drinking behavior  AUDIT-C Screenin) How often did you have a drink containing alcohol in the past year? 2 to 3 times a week  2) How many drinks did you have on a typical day when you were drinking in the past year? 1 to 2  3) How often did you have 6 or more drinks on one occasion in the past year? never    AUDIT-C Score: 3  Interpretation: Score 0-3 (male): Negative screen for alcohol misuse    Single Item Drug Screening:  How often have you used an illegal drug (including marijuana) or a prescription medication for non-medical reasons in the past year? less than monthly    Single Item Drug Screen Score: 1  Interpretation: POSITIVE screen for possible drug use disorder    Drug Abuse Screening Test (DAST-10):  1) Have you used drugs other than those required for medical reasons? Yes  2) Do you abuse more than one drug at a time? No  3) Are you always able to stop using drugs when you want to? Yes  4) Have you had \"blackouts\" or \"flashbacks\" as a result of drug use? No  5) Do you ever feel bad or guilty about your drug use? No  6) Does your spouse (or parents) ever complain about your involvement with drugs? No  7) Have you neglected your family because of your use of drugs? No  8) Have you engaged in illegal activities in order to obtain drugs? No  9) Have you ever experienced withdrawal symptoms (felt sick) when you stopped taking drugs? No  10) Have you had medical problems as a result of your drug use (e g , memory loss, hepatitis, convulsions, bleeding, etc )? No    DAST-10 Score: 1  Interpretation: Low level problems related to drug abuse    No results found       Physical Exam: " "    /60 (BP Location: Left arm, Patient Position: Sitting, Cuff Size: Adult)   Pulse 57   Temp (!) 97 3 °F (36 3 °C) (Temporal)   Ht 6' 1\" (1 854 m)   Wt 68 3 kg (150 lb 9 6 oz)   SpO2 99%   BMI 19 87 kg/m²     Physical Exam  Constitutional:       General: He is not in acute distress  Appearance: He is not ill-appearing  HENT:      Head: Normocephalic and atraumatic  Eyes:      General:         Right eye: No discharge  Left eye: No discharge  Extraocular Movements: Extraocular movements intact  Pupils: Pupils are equal, round, and reactive to light  Cardiovascular:      Rate and Rhythm: Normal rate  Pulmonary:      Effort: Pulmonary effort is normal  No respiratory distress  Breath sounds: No wheezing  Abdominal:      General: Bowel sounds are normal       Tenderness: There is abdominal tenderness (patient states that this is chronic )  Musculoskeletal:      Right lower leg: No edema  Left lower leg: No edema  Neurological:      General: No focal deficit present  Mental Status: He is alert     Psychiatric:         Mood and Affect: Mood normal          Behavior: Behavior normal           Brenton Umanzor MD  "

## 2023-06-21 NOTE — PATIENT INSTRUCTIONS
Medicare Preventive Visit Patient Instructions  Thank you for completing your Welcome to Medicare Visit or Medicare Annual Wellness Visit today  Your next wellness visit will be due in one year (6/21/2024)  The screening/preventive services that you may require over the next 5-10 years are detailed below  Some tests may not apply to you based off risk factors and/or age  Screening tests ordered at today's visit but not completed yet may show as past due  Also, please note that scanned in results may not display below  Preventive Screenings:  Service Recommendations Previous Testing/Comments   Colorectal Cancer Screening  · Colonoscopy    · Fecal Occult Blood Test (FOBT)/Fecal Immunochemical Test (FIT)  · Fecal DNA/Cologuard Test  · Flexible Sigmoidoscopy Age: 39-70 years old   Colonoscopy: every 10 years (May be performed more frequently if at higher risk)  OR  FOBT/FIT: every 1 year  OR  Cologuard: every 3 years  OR  Sigmoidoscopy: every 5 years  Screening may be recommended earlier than age 39 if at higher risk for colorectal cancer  Also, an individualized decision between you and your healthcare provider will decide whether screening between the ages of 74-80 would be appropriate  Colonoscopy: 06/01/2015  FOBT/FIT: Not on file  Cologuard: Not on file  Sigmoidoscopy: Not on file          Prostate Cancer Screening Individualized decision between patient and health care provider in men between ages of 53-78   Medicare will cover every 12 months beginning on the day after your 50th birthday PSA: 7 0 ng/mL           Hepatitis C Screening Once for adults born between 1945 and 1965  More frequently in patients at high risk for Hepatitis C Hep C Antibody: 09/13/2019        Diabetes Screening 1-2 times per year if you're at risk for diabetes or have pre-diabetes Fasting glucose: 83 mg/dL (5/13/2021)  A1C: 5 9 % (11/11/2022)      Cholesterol Screening Once every 5 years if you don't have a lipid disorder   May order more often based on risk factors  Lipid panel: 11/11/2022         Other Preventive Screenings Covered by Medicare:  1  Abdominal Aortic Aneurysm (AAA) Screening: covered once if your at risk  You're considered to be at risk if you have a family history of AAA or a male between the age of 73-68 who smoking at least 100 cigarettes in your lifetime  2  Lung Cancer Screening: covers low dose CT scan once per year if you meet all of the following conditions: (1) Age 50-69; (2) No signs or symptoms of lung cancer; (3) Current smoker or have quit smoking within the last 15 years; (4) You have a tobacco smoking history of at least 20 pack years (packs per day x number of years you smoked); (5) You get a written order from a healthcare provider  3  Glaucoma Screening: covered annually if you're considered high risk: (1) You have diabetes OR (2) Family history of glaucoma OR (3)  aged 48 and older OR (3)  American aged 72 and older  3  Osteoporosis Screening: covered every 2 years if you meet one of the following conditions: (1) Have a vertebral abnormality; (2) On glucocorticoid therapy for more than 3 months; (3) Have primary hyperparathyroidism; (4) On osteoporosis medications and need to assess response to drug therapy  5  HIV Screening: covered annually if you're between the age of 12-76  Also covered annually if you are younger than 13 and older than 72 with risk factors for HIV infection  For pregnant patients, it is covered up to 3 times per pregnancy      Immunizations:  Immunization Recommendations   Influenza Vaccine Annual influenza vaccination during flu season is recommended for all persons aged >= 6 months who do not have contraindications   Pneumococcal Vaccine   * Pneumococcal conjugate vaccine = PCV13 (Prevnar 13), PCV15 (Vaxneuvance), PCV20 (Prevnar 20)  * Pneumococcal polysaccharide vaccine = PPSV23 (Pneumovax) Adults 25-60 years old: 1-3 doses may be recommended based on certain risk factors  Adults 72 years old: 1-2 doses may be recommended based off what pneumonia vaccine you previously received   Hepatitis B Vaccine 3 dose series if at intermediate or high risk (ex: diabetes, end stage renal disease, liver disease)   Tetanus (Td) Vaccine - COST NOT COVERED BY MEDICARE PART B Following completion of primary series, a booster dose should be given every 10 years to maintain immunity against tetanus  Td may also be given as tetanus wound prophylaxis  Tdap Vaccine - COST NOT COVERED BY MEDICARE PART B Recommended at least once for all adults  For pregnant patients, recommended with each pregnancy  Shingles Vaccine (Shingrix) - COST NOT COVERED BY MEDICARE PART B  2 shot series recommended in those aged 48 and above     Health Maintenance Due:      Topic Date Due   • Colorectal Cancer Screening  06/01/2025   • Hepatitis C Screening  Completed     Immunizations Due:      Topic Date Due   • COVID-19 Vaccine (4 - Pfizer series) 01/28/2022   • Pneumococcal Vaccine: 65+ Years (1 - PCV) Never done   • Influenza Vaccine (Season Ended) 09/01/2023     Advance Directives   What are advance directives? Advance directives are legal documents that state your wishes and plans for medical care  These plans are made ahead of time in case you lose your ability to make decisions for yourself  Advance directives can apply to any medical decision, such as the treatments you want, and if you want to donate organs  What are the types of advance directives? There are many types of advance directives, and each state has rules about how to use them  You may choose a combination of any of the following:  · Living will: This is a written record of the treatment you want  You can also choose which treatments you do not want, which to limit, and which to stop at a certain time  This includes surgery, medicine, IV fluid, and tube feedings  · Durable power of  for healthcare [facility]):   This is a written record that states who you want to make healthcare choices for you when you are unable to make them for yourself  This person, called a proxy, is usually a family member or a friend  You may choose more than 1 proxy  · Do not resuscitate (DNR) order:  A DNR order is used in case your heart stops beating or you stop breathing  It is a request not to have certain forms of treatment, such as CPR  A DNR order may be included in other types of advance directives  · Medical directive: This covers the care that you want if you are in a coma, near death, or unable to make decisions for yourself  You can list the treatments you want for each condition  Treatment may include pain medicine, surgery, blood transfusions, dialysis, IV or tube feedings, and a ventilator (breathing machine)  · Values history: This document has questions about your views, beliefs, and how you feel and think about life  This information can help others choose the care that you would choose  Why are advance directives important? An advance directive helps you control your care  Although spoken wishes may be used, it is better to have your wishes written down  Spoken wishes can be misunderstood, or not followed  Treatments may be given even if you do not want them  An advance directive may make it easier for your family to make difficult choices about your care  © Copyright [employer] 2018 Information is for End User's use only and may not be sold, redistributed or otherwise used for commercial purposes   All illustrations and images included in CareNotes® are the copyrighted property of [employer]  or [address] [place] [address] [employer]

## 2023-07-01 LAB
25(OH)D3+25(OH)D2 SERPL-MCNC: 77.4 NG/ML (ref 30–100)
ALBUMIN SERPL-MCNC: 4.5 G/DL (ref 3.8–4.8)
ALBUMIN/GLOB SERPL: 2.3 {RATIO} (ref 1.2–2.2)
ALP SERPL-CCNC: 83 IU/L (ref 44–121)
ALT SERPL-CCNC: 20 IU/L (ref 0–44)
APPEARANCE UR: CLEAR
AST SERPL-CCNC: 22 IU/L (ref 0–40)
BACTERIA URNS QL MICRO: NORMAL
BASOPHILS # BLD AUTO: 0 X10E3/UL (ref 0–0.2)
BASOPHILS NFR BLD AUTO: 1 %
BILIRUB SERPL-MCNC: 0.7 MG/DL (ref 0–1.2)
BILIRUB UR QL STRIP: NEGATIVE
BUN SERPL-MCNC: 15 MG/DL (ref 8–27)
BUN/CREAT SERPL: 18 (ref 10–24)
CALCIUM SERPL-MCNC: 9.4 MG/DL (ref 8.6–10.2)
CASTS URNS QL MICRO: NORMAL /LPF
CHLORIDE SERPL-SCNC: 104 MMOL/L (ref 96–106)
CHOLEST SERPL-MCNC: 170 MG/DL (ref 100–199)
CO2 SERPL-SCNC: 23 MMOL/L (ref 20–29)
COLOR UR: YELLOW
CREAT SERPL-MCNC: 0.82 MG/DL (ref 0.76–1.27)
EGFR: 97 ML/MIN/1.73
EOSINOPHIL # BLD AUTO: 0.2 X10E3/UL (ref 0–0.4)
EOSINOPHIL NFR BLD AUTO: 5 %
EPI CELLS #/AREA URNS HPF: NORMAL /HPF (ref 0–10)
ERYTHROCYTE [DISTWIDTH] IN BLOOD BY AUTOMATED COUNT: 12 % (ref 11.6–15.4)
EST. AVERAGE GLUCOSE BLD GHB EST-MCNC: 123 MG/DL
GLOBULIN SER-MCNC: 2 G/DL (ref 1.5–4.5)
GLUCOSE SERPL-MCNC: 95 MG/DL (ref 70–99)
GLUCOSE UR QL: NEGATIVE
HBA1C MFR BLD: 5.9 % (ref 4.8–5.6)
HCT VFR BLD AUTO: 40.3 % (ref 37.5–51)
HDLC SERPL-MCNC: 63 MG/DL
HGB BLD-MCNC: 13.3 G/DL (ref 13–17.7)
HGB UR QL STRIP: NEGATIVE
IMM GRANULOCYTES # BLD: 0 X10E3/UL (ref 0–0.1)
IMM GRANULOCYTES NFR BLD: 0 %
KETONES UR QL STRIP: ABNORMAL
LDLC SERPL CALC-MCNC: 96 MG/DL (ref 0–99)
LDLC/HDLC SERPL: 1.5 RATIO (ref 0–3.6)
LEUKOCYTE ESTERASE UR QL STRIP: NEGATIVE
LYMPHOCYTES # BLD AUTO: 0.9 X10E3/UL (ref 0.7–3.1)
LYMPHOCYTES NFR BLD AUTO: 22 %
MCH RBC QN AUTO: 32.5 PG (ref 26.6–33)
MCHC RBC AUTO-ENTMCNC: 33 G/DL (ref 31.5–35.7)
MCV RBC AUTO: 99 FL (ref 79–97)
MICRO URNS: ABNORMAL
MICRO URNS: ABNORMAL
MONOCYTES # BLD AUTO: 0.5 X10E3/UL (ref 0.1–0.9)
MONOCYTES NFR BLD AUTO: 11 %
NEUTROPHILS # BLD AUTO: 2.6 X10E3/UL (ref 1.4–7)
NEUTROPHILS NFR BLD AUTO: 61 %
NITRITE UR QL STRIP: NEGATIVE
PH UR STRIP: 5.5 [PH] (ref 5–7.5)
PLATELET # BLD AUTO: 228 X10E3/UL (ref 150–450)
POTASSIUM SERPL-SCNC: 4.6 MMOL/L (ref 3.5–5.2)
PROT SERPL-MCNC: 6.5 G/DL (ref 6–8.5)
PROT UR QL STRIP: NEGATIVE
PSA SERPL-MCNC: 8.1 NG/ML (ref 0–4)
RBC # BLD AUTO: 4.09 X10E6/UL (ref 4.14–5.8)
RBC #/AREA URNS HPF: NORMAL /HPF (ref 0–2)
SL AMB URINALYSIS REFLEX: ABNORMAL
SL AMB VLDL CHOLESTEROL CALC: 11 MG/DL (ref 5–40)
SODIUM SERPL-SCNC: 141 MMOL/L (ref 134–144)
SP GR UR: 1.03 (ref 1–1.03)
TRIGL SERPL-MCNC: 53 MG/DL (ref 0–149)
TSH SERPL DL<=0.005 MIU/L-ACNC: 1.38 UIU/ML (ref 0.45–4.5)
UROBILINOGEN UR STRIP-ACNC: 0.2 MG/DL (ref 0.2–1)
WBC # BLD AUTO: 4.2 X10E3/UL (ref 3.4–10.8)
WBC #/AREA URNS HPF: NORMAL /HPF (ref 0–5)

## 2023-07-19 ENCOUNTER — OFFICE VISIT (OUTPATIENT)
Dept: FAMILY MEDICINE CLINIC | Facility: CLINIC | Age: 68
End: 2023-07-19
Payer: COMMERCIAL

## 2023-07-19 VITALS
DIASTOLIC BLOOD PRESSURE: 60 MMHG | TEMPERATURE: 97.3 F | OXYGEN SATURATION: 99 % | HEIGHT: 73 IN | SYSTOLIC BLOOD PRESSURE: 112 MMHG | WEIGHT: 150.4 LBS | HEART RATE: 55 BPM | BODY MASS INDEX: 19.93 KG/M2

## 2023-07-19 DIAGNOSIS — R63.4 WEIGHT LOSS: Primary | ICD-10-CM

## 2023-07-19 DIAGNOSIS — R97.20 ELEVATED PSA: ICD-10-CM

## 2023-07-19 PROCEDURE — 99213 OFFICE O/P EST LOW 20 MIN: CPT | Performed by: FAMILY MEDICINE

## 2023-07-19 NOTE — PATIENT INSTRUCTIONS
Weight Gain Tips for Athletes   AMBULATORY CARE:   Why some athletes need to gain weight:  Some athletes need more calories to gain weight or maintain their weight. For example, you may need to maintain your weight for endurance sports because of the large amount of calories you burn. Endurance sports include running, swimming, and biking over long time periods or distances. Weight gain may also help to build muscle. This may help you if you participate in contact sports, such as football and hockey. A healthy weight gain goal  is about ½ to 1 pound each week. Gain weight slowly to avoid gaining too much body fat. An exercise program that includes strength training will help you gain muscle weight. Ask your dietitian how much weight gain is right for you. A healthy meal plan for an athlete  includes a variety of healthy foods during regular meals and snacks. The following are suggested amounts of fat, carbohydrate, and protein you may need each day. Your dietitian can tell you how many calories you need each day to gain weight. Fat  is important because it provides energy and vitamins. You need 20% to 35% of your total daily calories to come from fat. For example, a man who needs about 2900 calories per day would need 725 fat calories each day. There are both healthy fats and unhealthy fats in foods. Ask your healthcare provider for more information about different types of fat and the total amount of fat you should have. Carbohydrate  is the main source of energy your body uses during exercise. The amount you need depends on your daily calorie needs and the sport that you do. It also depends on whether you are male or female. Athletes need 6 to 10 grams of carbohydrates for each kilogram of body weight. To find your weight in kilograms, divide your weight in pounds by 2.2. Then multiply this number by your carbohydrate needs.  For example, if you weigh 70 kilograms, you would need 420 to 700 grams of carbohydrate each day. Protein  helps to build and repair muscle, produce hormones, boost your immune system, and replace blood cells. The amount of protein you need is only slightly higher than the amount suggested for people who do not exercise. Endurance athletes need 1.2 to 1.4 grams for each kilogram of body weight per day. Athletes who do strength training (such as lifting weights) need 1.2 to 1.7 grams for each kilogram of body weight per day. People can usually meet their needs for protein by following a balanced meal plan. Good sources of protein are lean meats, poultry, eggs, milk, cheese, peanut butter, and beans. Protein or amino acid supplements are not needed for weight gain if you follow a healthy and balanced meal plan. How to increase calories:  You need to eat or drink 500 to 1000 extra calories each day to gain about ½ to 1 pound each week. Your dietitian can tell you how many calories you need each day to gain weight. The following are some ways to add extra calories to your diet:  Eat every 2 to 3 hours and 30 minutes after you exercise. Include whole-grain carbohydrates and a lean protein food in each of your meals and snacks. Examples of whole-grain carbohydrates include whole-wheat bread, rolls, and bagels. Examples of lean protein include chicken and turkey. Add high-calorie foods to your meals. Examples include cheese, peanut butter, avocado, nuts, and granola. Carry healthy snacks with you. Examples of snacks that provide about 500 calories:    8 saltine crackers, 1 ounce of cheese, and 1 cup of ice cream    1 cup of dry cereal with 1 cup of whole milk, 1 banana, and 1 slice of toast with 1 tablespoon of peanut butter    6 pancho cracker squares with 2 tablespoons of peanut butter, 2 tablespoons of raisins, and 1 cup of orange juice    Healthy foods that are higher in calories:   Choose whole-grain breads, such as honey bran, rye, and pumpernickel instead of white bread. Add peanut butter, margarine, jam, or honey for extra calories. Eat high-calorie cereals, such as granola and cereals that contain nuts. These are healthy choices and have more calories per serving than puffed rice or corn flakes. The serving size of a cereal is listed on the food label. You can also add more calories to cereals by adding nuts, raisins, and other fruits. Bananas, pineapple, mangos, raisins, dates, and dried fruit have more calories per serving than watery fruits. Some examples of watery fruits are watermelon, grapefruit, apples, and peaches. Trail mix is a good choice because it contains dried fruits and nuts. Add margarine, almonds, and cheese to vegetables for extra calories. Stir-frying vegetables with canola or olive oil will also add extra calories. Cook chicken or fish in a small amount of canola or olive oil. Red meats, such as beef, pork, and lamb, have more calories, but they also have more saturated fat. Saturated fat is an unhealthy type of fat because it may increase blood cholesterol. When you eat red meats, choose leaner cuts. Some examples of lean cuts of red meat are round or sirloin steak, ground round, fresh or boiled ham, or center loin chop. Liquids you should drink:   You can add calories to your diet by drinking juice, milk, milkshakes, and instant breakfast drinks. Drink plenty of water to prevent dehydration. Dehydration can cause serious health problems. Athletes have higher liquid needs because they lose water through sweat. Always carry water with you during long exercise sessions. You can wear a special bag or belt made to carry water on your back or around your waist. Drink sports drinks during exercise sessions that last longer than 1 hour. The best way to check if you are drinking enough liquids is to check the color of your urine. Urine should be clear or very light yellow, with little or no smell.  If your urine is dark or smells strong, you may not be drinking enough. Follow up with your doctor as directed:  Write down your questions so you remember to ask them during your visits. © Copyright [employer] 2022 Information is for End User's use only and may not be sold, redistributed or otherwise used for commercial purposes. The above information is an  only. It is not intended as medical advice for individual conditions or treatments. Talk to your doctor, nurse or pharmacist before following any medical regimen to see if it is safe and effective for you.

## 2023-07-19 NOTE — PROGRESS NOTES
Assessment/Plan:    Weight loss  Weight is stable at 150lbs. Labwork significant for elevated PSA and patient was advised to make an appointment with Urology for further evaluation. He is reluctant to go as he has had an elevated PSA for a number of years and had a painful prostate biopsy but will think about it. He will continue to monitor his weight and report back. Elevated PSA            Subjective:      Patient ID: Madeline Benton is a 79 y.o. male. HPI  Madeline Benton is a very pleasant 79year old male with a past medical history of prediabetes who presents today for a follow up of his weight after having lost 9lbs since November 2022. Patient noticed that ever since starting a new job which has involved a lot of walking which he has been tracking on his 'FitBit' and sometimes takes 30,000 steps a day. He has also been tracking calories burned and thinks that he is burning more calories than he is consuming. He denies any other symptoms and had lab work completed which was remarkable for elevated PSA of 8.1. His PSA has been steadily rising and he underwent a prostate biopsy last year which was negative. The following portions of the patient's history were reviewed and updated as appropriate: allergies, current medications, past family history, past medical history, past social history, past surgical history and problem list.    Review of Systems   Constitutional: Negative. HENT: Negative. Eyes: Negative. Respiratory: Negative. Cardiovascular: Negative. Gastrointestinal: Negative. Musculoskeletal: Negative. Skin: Negative. Neurological: Negative. Psychiatric/Behavioral: Negative.           Objective:      /60 (BP Location: Left arm, Patient Position: Sitting, Cuff Size: Adult)   Pulse 55   Temp (!) 97.3 °F (36.3 °C) (Temporal)   Ht 6' 1" (1.854 m)   Wt 68.2 kg (150 lb 6.4 oz)   SpO2 99%   BMI 19.84 kg/m²          Physical Exam  Constitutional:       General: He is not in acute distress. Appearance: He is not ill-appearing. HENT:      Head: Normocephalic and atraumatic. Eyes:      General:         Right eye: No discharge. Left eye: No discharge. Extraocular Movements: Extraocular movements intact. Cardiovascular:      Rate and Rhythm: Normal rate. Pulmonary:      Effort: No respiratory distress. Neurological:      General: No focal deficit present. Mental Status: He is alert.    Psychiatric:         Mood and Affect: Mood normal.         Behavior: Behavior normal.

## 2024-01-10 ENCOUNTER — RA CDI HCC (OUTPATIENT)
Dept: OTHER | Facility: HOSPITAL | Age: 69
End: 2024-01-10

## 2024-01-18 ENCOUNTER — OFFICE VISIT (OUTPATIENT)
Dept: FAMILY MEDICINE CLINIC | Facility: CLINIC | Age: 69
End: 2024-01-18
Payer: COMMERCIAL

## 2024-01-18 VITALS
WEIGHT: 153.8 LBS | OXYGEN SATURATION: 98 % | HEIGHT: 73 IN | DIASTOLIC BLOOD PRESSURE: 62 MMHG | TEMPERATURE: 97.5 F | SYSTOLIC BLOOD PRESSURE: 112 MMHG | HEART RATE: 60 BPM | BODY MASS INDEX: 20.38 KG/M2

## 2024-01-18 DIAGNOSIS — N13.8 BPH WITH URINARY OBSTRUCTION: ICD-10-CM

## 2024-01-18 DIAGNOSIS — E44.1 MILD PROTEIN-CALORIE MALNUTRITION (HCC): ICD-10-CM

## 2024-01-18 DIAGNOSIS — R97.20 ELEVATED PSA: ICD-10-CM

## 2024-01-18 DIAGNOSIS — R73.03 PREDIABETES: Primary | ICD-10-CM

## 2024-01-18 DIAGNOSIS — N40.1 BPH WITH URINARY OBSTRUCTION: ICD-10-CM

## 2024-01-18 PROCEDURE — 99214 OFFICE O/P EST MOD 30 MIN: CPT | Performed by: FAMILY MEDICINE

## 2024-01-18 NOTE — PROGRESS NOTES
"Assessment/Plan:    No problem-specific Assessment & Plan notes found for this encounter.       Diagnoses and all orders for this visit:    Prediabetes  Comments:  Previous A1C 5.9, discussed the importance of diet and lifestyle modifications    Elevated PSA  Comments:  Discussed referral to urology in the past but patient declined, will continue to monitor    BPH with urinary obstruction  Comments:  Manageable; patient not on any medications at this time.    Mild protein-calorie malnutrition (HCC)  Comments:  Patient has gained 3lbs since previous office visit          Subjective:      Patient ID: Jun Patterson is a 68 y.o. male.    HPI  Jun Patterson is a very pleasant 68 year old male who presents today for a follow up. He has been struggling to limit carbs and added sugars. He also states that whenever he drinks 2 or more beers or eats a lot of carbs he finds that he has to urinate a lot more frequently. Otherwise he feels well and endorses no other complaints at this time. He does have a right inguinal hernia which occasionally bothers him but is easily reducible. He does not wish to have it repaired at this time.     The following portions of the patient's history were reviewed and updated as appropriate: allergies, current medications, past family history, past medical history, past social history, past surgical history, and problem list.    Review of Systems   Constitutional: Negative.    HENT: Negative.     Eyes: Negative.    Respiratory: Negative.     Cardiovascular: Negative.    Gastrointestinal: Negative.    Musculoskeletal: Negative.    Skin: Negative.    Neurological: Negative.    Psychiatric/Behavioral: Negative.           Objective:      /62 (BP Location: Left arm, Patient Position: Sitting, Cuff Size: Standard)   Pulse 60   Temp 97.5 °F (36.4 °C) (Temporal)   Ht 6' 1\" (1.854 m)   Wt 69.8 kg (153 lb 12.8 oz)   SpO2 98%   BMI 20.29 kg/m²          Physical Exam  Constitutional:       " General: He is not in acute distress.     Appearance: He is not ill-appearing.   HENT:      Head: Normocephalic and atraumatic.   Eyes:      General:         Right eye: No discharge.         Left eye: No discharge.      Extraocular Movements: Extraocular movements intact.   Cardiovascular:      Rate and Rhythm: Normal rate.      Pulses: Normal pulses.   Pulmonary:      Effort: Pulmonary effort is normal. No respiratory distress.      Breath sounds: No wheezing.   Abdominal:      General: Bowel sounds are normal. There is no distension.      Palpations: Abdomen is soft.      Tenderness: There is abdominal tenderness (Right lower quadrant).   Musculoskeletal:      Right lower leg: No edema.      Left lower leg: No edema.   Neurological:      General: No focal deficit present.      Mental Status: He is alert.   Psychiatric:         Mood and Affect: Mood normal.         Behavior: Behavior normal.

## 2024-06-16 ENCOUNTER — RA CDI HCC (OUTPATIENT)
Dept: OTHER | Facility: HOSPITAL | Age: 69
End: 2024-06-16

## 2024-06-24 ENCOUNTER — OFFICE VISIT (OUTPATIENT)
Dept: FAMILY MEDICINE CLINIC | Facility: CLINIC | Age: 69
End: 2024-06-24
Payer: COMMERCIAL

## 2024-06-24 VITALS
TEMPERATURE: 97.8 F | OXYGEN SATURATION: 99 % | SYSTOLIC BLOOD PRESSURE: 114 MMHG | HEIGHT: 73 IN | BODY MASS INDEX: 20.33 KG/M2 | WEIGHT: 153.4 LBS | HEART RATE: 62 BPM | DIASTOLIC BLOOD PRESSURE: 66 MMHG

## 2024-06-24 DIAGNOSIS — E78.00 ELEVATED LDL CHOLESTEROL LEVEL: ICD-10-CM

## 2024-06-24 DIAGNOSIS — R73.03 PREDIABETES: ICD-10-CM

## 2024-06-24 DIAGNOSIS — Z00.00 MEDICARE ANNUAL WELLNESS VISIT, SUBSEQUENT: Primary | ICD-10-CM

## 2024-06-24 DIAGNOSIS — Z12.83 SCREENING FOR SKIN CANCER: ICD-10-CM

## 2024-06-24 DIAGNOSIS — R97.20 ELEVATED PSA: ICD-10-CM

## 2024-06-24 PROCEDURE — 99214 OFFICE O/P EST MOD 30 MIN: CPT | Performed by: FAMILY MEDICINE

## 2024-06-24 PROCEDURE — G0439 PPPS, SUBSEQ VISIT: HCPCS | Performed by: FAMILY MEDICINE

## 2024-06-24 NOTE — PATIENT INSTRUCTIONS
[facility] dermatology [phone]    Medicare Preventive Visit Patient Instructions  Thank you for completing your Welcome to Medicare Visit or Medicare Annual Wellness Visit today. Your next wellness visit will be due in one year (6/25/2025).  The screening/preventive services that you may require over the next 5-10 years are detailed below. Some tests may not apply to you based off risk factors and/or age. Screening tests ordered at today's visit but not completed yet may show as past due. Also, please note that scanned in results may not display below.  Preventive Screenings:  Service Recommendations Previous Testing/Comments   Colorectal Cancer Screening  Colonoscopy    Fecal Occult Blood Test (FOBT)/Fecal Immunochemical Test (FIT)  Fecal DNA/Cologuard Test  Flexible Sigmoidoscopy Age: 45-75 years old   Colonoscopy: every 10 years (May be performed more frequently if at higher risk)  OR  FOBT/FIT: every 1 year  OR  Cologuard: every 3 years  OR  Sigmoidoscopy: every 5 years  Screening may be recommended earlier than age 45 if at higher risk for colorectal cancer. Also, an individualized decision between you and your healthcare provider will decide whether screening between the ages of 76-85 would be appropriate. Colonoscopy: 06/01/2015  FOBT/FIT: Not on file  Cologuard: Not on file  Sigmoidoscopy: Not on file          Prostate Cancer Screening Individualized decision between patient and health care provider in men between ages of 55-69   Medicare will cover every 12 months beginning on the day after your 50th birthday PSA: 8.1 ng/mL           Hepatitis C Screening Once for adults born between 1945 and 1965  More frequently in patients at high risk for Hepatitis C Hep C Antibody: 09/13/2019        Diabetes Screening 1-2 times per year if you're at risk for diabetes or have pre-diabetes Fasting glucose: 83 mg/dL (5/13/2021)  A1C: 5.9 % (6/30/2023)      Cholesterol Screening Once every 5 years if you don't have a  lipid disorder. May order more often based on risk factors. Lipid panel: 06/30/2023         Other Preventive Screenings Covered by Medicare:  Abdominal Aortic Aneurysm (AAA) Screening: covered once if your at risk. You're considered to be at risk if you have a family history of AAA or a male between the age of 65-75 who smoking at least 100 cigarettes in your lifetime.  Lung Cancer Screening: covers low dose CT scan once per year if you meet all of the following conditions: (1) Age 55-77; (2) No signs or symptoms of lung cancer; (3) Current smoker or have quit smoking within the last 15 years; (4) You have a tobacco smoking history of at least 20 pack years (packs per day x number of years you smoked); (5) You get a written order from a healthcare provider.  Glaucoma Screening: covered annually if you're considered high risk: (1) You have diabetes OR (2) Family history of glaucoma OR (3)  aged 50 and older OR (4)  American aged 65 and older  Osteoporosis Screening: covered every 2 years if you meet one of the following conditions: (1) Have a vertebral abnormality; (2) On glucocorticoid therapy for more than 3 months; (3) Have primary hyperparathyroidism; (4) On osteoporosis medications and need to assess response to drug therapy.  HIV Screening: covered annually if you're between the age of 15-65. Also covered annually if you are younger than 15 and older than 65 with risk factors for HIV infection. For pregnant patients, it is covered up to 3 times per pregnancy.    Immunizations:  Immunization Recommendations   Influenza Vaccine Annual influenza vaccination during flu season is recommended for all persons aged >= 6 months who do not have contraindications   Pneumococcal Vaccine   * Pneumococcal conjugate vaccine = PCV13 (Prevnar 13), PCV15 (Vaxneuvance), PCV20 (Prevnar 20)  * Pneumococcal polysaccharide vaccine = PPSV23 (Pneumovax) Adults 19-65 yo with certain risk factors or if 65+ yo  If  never received any pneumonia vaccine: recommend Prevnar 20 (PCV20)  Give PCV20 if previously received 1 dose of PCV13 or PPSV23   Hepatitis B Vaccine 3 dose series if at intermediate or high risk (ex: diabetes, end stage renal disease, liver disease)   Respiratory syncytial virus (RSV) Vaccine - COVERED BY MEDICARE PART D  * RSVPreF3 (Arexvy) CDC recommends that adults 60 years of age and older may receive a single dose of RSV vaccine using shared clinical decision-making (SCDM)   Tetanus (Td) Vaccine - COST NOT COVERED BY MEDICARE PART B Following completion of primary series, a booster dose should be given every 10 years to maintain immunity against tetanus. Td may also be given as tetanus wound prophylaxis.   Tdap Vaccine - COST NOT COVERED BY MEDICARE PART B Recommended at least once for all adults. For pregnant patients, recommended with each pregnancy.   Shingles Vaccine (Shingrix) - COST NOT COVERED BY MEDICARE PART B  2 shot series recommended in those 19 years and older who have or will have weakened immune systems or those 50 years and older     Health Maintenance Due:      Topic Date Due    Colorectal Cancer Screening  06/01/2025    Hepatitis C Screening  Completed     Immunizations Due:      Topic Date Due    Pneumococcal Vaccine: 65+ Years (1 of 1 - PCV) Never done    COVID-19 Vaccine (5 - 2023-24 season) 09/01/2023    Influenza Vaccine (Season Ended) 09/01/2024     Advance Directives   What are advance directives?  Advance directives are legal documents that state your wishes and plans for medical care. These plans are made ahead of time in case you lose your ability to make decisions for yourself. Advance directives can apply to any medical decision, such as the treatments you want, and if you want to donate organs.   What are the types of advance directives?  There are many types of advance directives, and each state has rules about how to use them. You may choose a combination of any of the  following:  Living will:  This is a written record of the treatment you want. You can also choose which treatments you do not want, which to limit, and which to stop at a certain time. This includes surgery, medicine, IV fluid, and tube feedings.   Durable power of  for healthcare (DPAHC):  This is a written record that states who you want to make healthcare choices for you when you are unable to make them for yourself. This person, called a proxy, is usually a family member or a friend. You may choose more than 1 proxy.  Do not resuscitate (DNR) order:  A DNR order is used in case your heart stops beating or you stop breathing. It is a request not to have certain forms of treatment, such as CPR. A DNR order may be included in other types of advance directives.  Medical directive:  This covers the care that you want if you are in a coma, near death, or unable to make decisions for yourself. You can list the treatments you want for each condition. Treatment may include pain medicine, surgery, blood transfusions, dialysis, IV or tube feedings, and a ventilator (breathing machine).  Values history:  This document has questions about your views, beliefs, and how you feel and think about life. This information can help others choose the care that you would choose.  Why are advance directives important?  An advance directive helps you control your care. Although spoken wishes may be used, it is better to have your wishes written down. Spoken wishes can be misunderstood, or not followed. Treatments may be given even if you do not want them. An advance directive may make it easier for your family to make difficult choices about your care.       © Copyright [employer] 2018 Information is for End User's use only and may not be sold, redistributed or otherwise used for commercial purposes. All illustrations and images included in CareNotes® are the copyrighted property of [employer] or [employer]

## 2024-06-24 NOTE — PROGRESS NOTES
Ambulatory Visit  Name: Jun Patterson      : 1955      MRN: [id number]  Encounter Provider: June Ríos MD  Encounter Date: 2024   Encounter department: [facility]    Assessment & Plan   1. Medicare annual wellness visit, subsequent  2. Prediabetes  -     Hemoglobin A1C; Future  3. Elevated PSA  -     PSA Total, Diagnostic; Future  4. Elevated LDL cholesterol level  -     Lipid Panel with Direct LDL reflex; Future  5. Screening for skin cancer  -     Ambulatory Referral to Dermatology; Future       Preventive health issues were discussed with patient, and age appropriate screening tests were ordered as noted in patient's After Visit Summary. Personalized health advice and appropriate referrals for health education or preventive services given if needed, as noted in patient's After Visit Summary. Healthcare maintenance reviewed and up to date. PSA continues to trend upwards; repeat PSA testing ordered. Will order repeat A1C and lipid panel due to prediabetes and previously elevated LDL. Follow up in one year for medicare wellness visit or as needed.     History of Present Illness   Jun Patterson is a very pleasant 69 year old male who presents today for a Medicare wellness visit and follow up. Apart from some aches and pains he otherwise feels well. He does have a right inguinal hernia which occasionally bothers him but is easily reducible. He does not wish to have it repaired at this time. He has been taking care to limits sugars in order to manage his prediabetes. He is requesting a referral to see dermatology for a full body skin check as his previous dermatologist retired.     Patient Care Team:  June Ríos MD as PCP - General (Family Medicine)  Albaro Smith MD (Urology)    Review of Systems   Constitutional: Negative.    HENT: Negative.     Eyes: Negative.    Respiratory: Negative.     Cardiovascular: Negative.    Gastrointestinal: Negative.    Musculoskeletal:  Positive  for arthralgias.   Skin: Negative.    Neurological: Negative.    Psychiatric/Behavioral: Negative.       Medical History Reviewed by provider this encounter:       Annual Wellness Visit Questionnaire   Jun is here for his Subsequent Wellness visit.     Health Risk Assessment:   Patient rates overall health as excellent. Patient feels that their physical health rating is same. Patient is very satisfied with their life. Eyesight was rated as same. Hearing was rated as same. Patient feels that their emotional and mental health rating is same. Patients states they are never, rarely angry. Patient states they are sometimes unusually tired/fatigued. Pain experienced in the last 7 days has been some. Patient's pain rating has been 1/10. Patient states that he has experienced no weight loss or gain in last 6 months.     Depression Screening:   PHQ-2 Score: 0      Fall Risk Screening:   In the past year, patient has experienced: no history of falling in past year      Home Safety:  Patient does not have trouble with stairs inside or outside of their home. Patient has working smoke alarms and has working carbon monoxide detector. Home safety hazards include: none.     Nutrition:   Current diet is Regular.     Medications:   Patient is not currently taking any over-the-counter supplements. Patient is able to manage medications.     Activities of Daily Living (ADLs)/Instrumental Activities of Daily Living (IADLs):   Walk and transfer into and out of bed and chair?: Yes  Dress and groom yourself?: Yes    Bathe or shower yourself?: Yes    Feed yourself? Yes  Do your laundry/housekeeping?: Yes  Manage your money, pay your bills and track your expenses?: Yes  Make your own meals?: Yes    Do your own shopping?: Yes    Previous Hospitalizations:   Any hospitalizations or ED visits within the last 12 months?: No      Advance Care Planning:   Living will: No    Durable POA for healthcare: No    Advanced directive: No   "    PREVENTIVE SCREENINGS      Cardiovascular Screening:    General: Screening Current      Diabetes Screening:     General: Screening Current      Colorectal Cancer Screening:     General: Screening Current      Prostate Cancer Screening:    General: Screening Current      Abdominal Aortic Aneurysm (AAA) Screening:    Risk factors include: age between 65-74 yo        Lung Cancer Screening:     General: Screening Not Indicated      Hepatitis C Screening:    General: Screening Current    Screening, Brief Intervention, and Referral to Treatment (SBIRT)    Screening  Typical number of drinks in a day: 1  Typical number of drinks in a week: 5  Interpretation: Low risk drinking behavior.    AUDIT-C Screenin) How often did you have a drink containing alcohol in the past year? 2 to 3 times a week  2) How many drinks did you have on a typical day when you were drinking in the past year? 1 to 2  3) How often did you have 6 or more drinks on one occasion in the past year? never    AUDIT-C Score: 3  Interpretation: Score 0-3 (male): Negative screen for alcohol misuse    Single Item Drug Screening:  How often have you used an illegal drug (including marijuana) or a prescription medication for non-medical reasons in the past year? less than monthly    Single Item Drug Screen Score: 1  Interpretation: POSITIVE screen for possible drug use disorder    Drug Abuse Screening Test (DAST-10):  1) Have you used drugs other than those required for medical reasons? Yes  2) Do you abuse more than one drug at a time? No  3) Are you always able to stop using drugs when you want to? Yes  4) Have you had \"blackouts\" or \"flashbacks\" as a result of drug use? No  5) Do you ever feel bad or guilty about your drug use? No  6) Does your spouse (or parents) ever complain about your involvement with drugs? No  7) Have you neglected your family because of your use of drugs? No  8) Have you engaged in illegal activities in order to obtain drugs? " "No  9) Have you ever experienced withdrawal symptoms (felt sick) when you stopped taking drugs? No  10) Have you had medical problems as a result of your drug use (e.g., memory loss, hepatitis, convulsions, bleeding, etc.)? No    DAST-10 Score: 1  Interpretation: Low level problems related to drug abuse    Social Determinants of Health     Financial Resource Strain: Low Risk  (6/15/2023)    Overall Financial Resource Strain (CARDIA)     Difficulty of Paying Living Expenses: Not hard at all   Food Insecurity: No Food Insecurity (6/24/2024)    Hunger Vital Sign     Worried About Running Out of Food in the Last Year: Never true     Ran Out of Food in the Last Year: Never true   Transportation Needs: No Transportation Needs (6/24/2024)    PRAPARE - Transportation     Lack of Transportation (Medical): No     Lack of Transportation (Non-Medical): No   Housing Stability: Low Risk  (6/24/2024)    Housing Stability Vital Sign     Unable to Pay for Housing in the Last Year: No     Number of Times Moved in the Last Year: 0     Homeless in the Last Year: No   Utilities: Not At Risk (6/24/2024)    [facility] Utilities     Threatened with loss of utilities: No     No results found.    Objective     /66 (BP Location: Left arm, Patient Position: Sitting, Cuff Size: Adult)   Pulse 62   Temp 97.8 °F (36.6 °C) (Temporal)   Ht 6' 1\" (1.854 m)   Wt 69.6 kg (153 lb 6.4 oz)   SpO2 99%   BMI 20.24 kg/m²     Physical Exam  Constitutional:       General: He is not in acute distress.     Appearance: He is not ill-appearing.   HENT:      Head: Normocephalic and atraumatic.   Eyes:      General:         Right eye: No discharge.         Left eye: No discharge.      Extraocular Movements: Extraocular movements intact.   Cardiovascular:      Rate and Rhythm: Normal rate.      Pulses: Normal pulses.   Pulmonary:      Effort: Pulmonary effort is normal. No respiratory distress.      Breath sounds: No wheezing.   Abdominal:      General: There " is no distension.      Tenderness: There is no abdominal tenderness.   Musculoskeletal:      Right lower leg: No edema.      Left lower leg: No edema.   Neurological:      General: No focal deficit present.      Mental Status: He is alert.   Psychiatric:         Mood and Affect: Mood normal.         Behavior: Behavior normal.       Administrative Statements

## 2024-06-28 LAB
CHOLEST SERPL-MCNC: 177 MG/DL (ref 100–199)
HBA1C MFR BLD: 5.9 % (ref 4.8–5.6)
HDLC SERPL-MCNC: 67 MG/DL
LDLC SERPL CALC-MCNC: 100 MG/DL (ref 0–99)
LDLC/HDLC SERPL: 1.5 RATIO (ref 0–3.6)
PSA SERPL-MCNC: 7.3 NG/ML (ref 0–4)
SL AMB VLDL CHOLESTEROL CALC: 10 MG/DL (ref 5–40)
TRIGL SERPL-MCNC: 51 MG/DL (ref 0–149)

## 2025-02-25 ENCOUNTER — OFFICE VISIT (OUTPATIENT)
Dept: FAMILY MEDICINE CLINIC | Facility: CLINIC | Age: 70
End: 2025-02-25
Payer: COMMERCIAL

## 2025-02-25 VITALS
OXYGEN SATURATION: 95 % | HEART RATE: 83 BPM | DIASTOLIC BLOOD PRESSURE: 64 MMHG | SYSTOLIC BLOOD PRESSURE: 142 MMHG | HEIGHT: 73 IN | TEMPERATURE: 103 F | BODY MASS INDEX: 20.54 KG/M2 | WEIGHT: 155 LBS

## 2025-02-25 DIAGNOSIS — R09.81 NASAL CONGESTION: ICD-10-CM

## 2025-02-25 DIAGNOSIS — J10.1 INFLUENZA A: Primary | ICD-10-CM

## 2025-02-25 DIAGNOSIS — R05.9 COUGH, UNSPECIFIED TYPE: ICD-10-CM

## 2025-02-25 DIAGNOSIS — R50.9 FEVER, UNSPECIFIED FEVER CAUSE: ICD-10-CM

## 2025-02-25 LAB
SARS-COV-2 AG UPPER RESP QL IA: NEGATIVE
SL AMB POCT RAPID FLU A: POSITIVE
SL AMB POCT RAPID FLU B: NEGATIVE
VALID CONTROL: NORMAL

## 2025-02-25 PROCEDURE — 87811 SARS-COV-2 COVID19 W/OPTIC: CPT | Performed by: FAMILY MEDICINE

## 2025-02-25 PROCEDURE — 87804 INFLUENZA ASSAY W/OPTIC: CPT | Performed by: FAMILY MEDICINE

## 2025-02-25 PROCEDURE — 99213 OFFICE O/P EST LOW 20 MIN: CPT | Performed by: FAMILY MEDICINE

## 2025-02-25 PROCEDURE — G2211 COMPLEX E/M VISIT ADD ON: HCPCS | Performed by: FAMILY MEDICINE

## 2025-02-25 RX ORDER — OSELTAMIVIR PHOSPHATE 75 MG/1
75 CAPSULE ORAL EVERY 12 HOURS SCHEDULED
Qty: 10 CAPSULE | Refills: 0 | Status: SHIPPED | OUTPATIENT
Start: 2025-02-25 | End: 2025-03-02

## 2025-02-25 NOTE — PROGRESS NOTES
"Name: Jun Patterson      : 1955      MRN: [id number]  Encounter Provider: June Ríos MD  Encounter Date: 2025   Encounter department: [facility]  :  Assessment & Plan  Influenza A  Start treatment with Tamiflu; continue supportive care with plenty of hydration, tylenol, OTC medications etc.  ED precautions reviewed with patient  Orders:    oseltamivir (TAMIFLU) 75 mg capsule; Take 1 capsule (75 mg total) by mouth every 12 (twelve) hours for 5 days    Fever, unspecified fever cause    Orders:    POCT rapid flu A and B    POCT Rapid Covid Ag    Cough, unspecified type    Orders:    POCT rapid flu A and B    POCT Rapid Covid Ag    Nasal congestion    Orders:    POCT rapid flu A and B    POCT Rapid Covid Ag           History of Present Illness   Fever  This is a new problem. The current episode started today. The problem occurs constantly. The problem has been unchanged. Associated symptoms include chills, congestion, coughing, fatigue, a fever, myalgias and nausea. He has tried drinking, acetaminophen and sleep for the symptoms. The treatment provided no relief.     Review of Systems   Constitutional:  Positive for chills, fatigue and fever.   HENT:  Positive for congestion.    Respiratory:  Positive for cough. Negative for shortness of breath and wheezing.    Cardiovascular: Negative.    Gastrointestinal:  Positive for nausea.   Genitourinary: Negative.    Musculoskeletal:  Positive for myalgias.   Neurological: Negative.    Psychiatric/Behavioral: Negative.         Objective   /64 (BP Location: Left arm, Patient Position: Sitting, Cuff Size: Standard)   Pulse 83   Temp (!) 103 °F (39.4 °C) (Tympanic)   Ht 6' 1\" (1.854 m)   Wt 70.3 kg (155 lb)   SpO2 95%   BMI 20.45 kg/m²      Physical Exam  Constitutional:       General: He is not in acute distress.     Appearance: He is ill-appearing.   HENT:      Head: Normocephalic and atraumatic.      Right Ear: Tympanic membrane " normal.      Left Ear: Tympanic membrane normal.      Mouth/Throat:      Mouth: Mucous membranes are moist.      Pharynx: No oropharyngeal exudate or posterior oropharyngeal erythema.   Eyes:      General:         Right eye: No discharge.         Left eye: No discharge.      Extraocular Movements: Extraocular movements intact.   Cardiovascular:      Rate and Rhythm: Normal rate.   Pulmonary:      Effort: Pulmonary effort is normal. No respiratory distress.      Breath sounds: No wheezing.   Abdominal:      General: Bowel sounds are normal. There is no distension.      Palpations: Abdomen is soft.      Tenderness: There is no abdominal tenderness.   Neurological:      General: No focal deficit present.      Mental Status: He is alert.   Psychiatric:         Mood and Affect: Mood normal.         Behavior: Behavior normal.

## 2025-02-25 NOTE — LETTER
February 25, 2025     Patient: Jun Patterson  YOB: 1955  Date of Visit: 2/25/2025      To Whom it May Concern:    Jun Patterson is under my professional care. Jun was seen in my office on 2/25/2025. Jun may return to work on 3/3/25 .    If you have any questions or concerns, please don't hesitate to call.         Sincerely,          June Ríos MD

## 2025-02-28 ENCOUNTER — NURSE TRIAGE (OUTPATIENT)
Age: 70
End: 2025-02-28

## 2025-02-28 ENCOUNTER — OFFICE VISIT (OUTPATIENT)
Dept: FAMILY MEDICINE CLINIC | Facility: CLINIC | Age: 70
End: 2025-02-28
Payer: COMMERCIAL

## 2025-02-28 VITALS
HEART RATE: 64 BPM | SYSTOLIC BLOOD PRESSURE: 106 MMHG | HEIGHT: 73 IN | BODY MASS INDEX: 20.41 KG/M2 | DIASTOLIC BLOOD PRESSURE: 58 MMHG | WEIGHT: 154 LBS | TEMPERATURE: 97.3 F | OXYGEN SATURATION: 98 %

## 2025-02-28 DIAGNOSIS — R21 RASH: Primary | ICD-10-CM

## 2025-02-28 PROCEDURE — 99213 OFFICE O/P EST LOW 20 MIN: CPT | Performed by: FAMILY MEDICINE

## 2025-02-28 PROCEDURE — G2211 COMPLEX E/M VISIT ADD ON: HCPCS | Performed by: FAMILY MEDICINE

## 2025-02-28 NOTE — PROGRESS NOTES
"Name: Jun Patterson      : 1955      MRN: [id number]  Encounter Provider: June Ríos MD  Encounter Date: 2025   Encounter department: [facility]  :  Assessment & Plan  Rash  Likely a reaction secondary to Tamiflu. Since symptoms are improving patient has been advised to discontinue treatment and will continue with supportive care at this time. He may take over the counter antihistamines for itching/steroid cream.               History of Present Illness   Rash  This is a new problem. The current episode started today. The problem is unchanged. The affected locations include the back. The problem is mild. The rash is characterized by itchiness. He was exposed to a new medication. Pertinent negatives include no facial edema, fever or shortness of breath. Past treatments include nothing.     Patient was started on Tamiflu on Tuesday after he was diagnosed with Influenza. He denies any other changes to soaps, lotions or detergents. He does report that his flu symptoms are much improved.     Review of Systems   Constitutional:  Negative for chills and fever.   HENT: Negative.     Respiratory:  Negative for shortness of breath.    Gastrointestinal: Negative.    Genitourinary: Negative.    Musculoskeletal: Negative.    Skin:  Positive for rash.   Neurological: Negative.    Psychiatric/Behavioral: Negative.         Objective   /58 (BP Location: Left arm, Patient Position: Sitting, Cuff Size: Adult)   Pulse 64   Temp (!) 97.3 °F (36.3 °C) (Temporal)   Ht 6' 1\" (1.854 m)   Wt 69.9 kg (154 lb)   SpO2 98%   BMI 20.32 kg/m²      Physical Exam  Constitutional:       General: He is not in acute distress.     Appearance: He is not ill-appearing.   HENT:      Head: Normocephalic and atraumatic.   Eyes:      General:         Right eye: No discharge.         Left eye: No discharge.      Extraocular Movements: Extraocular movements intact.   Cardiovascular:      Rate and Rhythm: Normal " rate.   Pulmonary:      Effort: Pulmonary effort is normal. No respiratory distress.      Breath sounds: No wheezing.   Skin:     Findings: Rash present.   Neurological:      General: No focal deficit present.      Mental Status: He is alert.   Psychiatric:         Mood and Affect: Mood normal.         Behavior: Behavior normal.

## 2025-02-28 NOTE — TELEPHONE ENCOUNTER
"Received call from patient's wife stating that today patient started complaining of rash to upper back and abdomen.  Rash is red, pinpoint raised areas that are itchy.  He was diagnosed with Flu A and started on Tamiflu on 2/25.  Wife asking if patient should continue with medication.   Denies any swelling, difficulty swallowing or breathing. States that flu symptoms are improving.    Please call back to wife to advise at [phone]    Reason for Disposition   Taking new prescription medicine  (Exceptions: Finished taking new prescription antibiotic OR questions about flushing from niacin.)    Answer Assessment - Initial Assessment Questions  1. APPEARANCE of RASH: \"Describe the rash.\" (e.g., spots, blisters, raised areas, skin peeling, scaly)      Pin point small, red raised areas  2. SIZE: \"How big are the spots?\" (e.g., tip of pen, eraser, coin; inches, centimeters)      Pin point  3. LOCATION: \"Where is the rash located?\"      Upper shoulder, abdomen  4. COLOR: \"What color is the rash?\" (Note: It is difficult to assess rash color in people with darker-colored skin. When this situation occurs, simply ask the caller to describe what they see.)      red  5. ONSET: \"When did the rash begin?\"      Unsure, patient just told wife about it today  6. FEVER: \"Do you have a fever?\" If Yes, ask: \"What is your temperature, how was it measured, and when did it start?\"      Denies  7. ITCHING: \"Does the rash itch?\" If Yes, ask: \"How bad is the itch?\" (Scale 1-10; or mild, moderate, severe)      Yes mild to moderate  8. CAUSE: \"What do you think is causing the rash?\"      Unsure thinks it may be from Tamiflu  9. NEW MEDICINES: \"What new medicines are you taking?\" (e.g., name of antibiotic) \"When did you start taking this medication?\".      Tamiflu started on 2/25  10. OTHER SYMPTOMS: \"Do you have any other symptoms?\" (e.g., sore throat, fever, joint pain)        Denies    Protocols used: Rash - Widespread On " Drugs-Adult-[place]

## 2025-03-06 ENCOUNTER — TELEPHONE (OUTPATIENT)
Age: 70
End: 2025-03-06

## 2025-03-06 NOTE — TELEPHONE ENCOUNTER
Patient called the office requesting for the provider to extend his work note to return on 03/10/2025. Pt states his stomach still feels nauseous and would like to rest. Please review and advise.

## 2025-06-17 ENCOUNTER — RA CDI HCC (OUTPATIENT)
Dept: OTHER | Facility: HOSPITAL | Age: 70
End: 2025-06-17

## 2025-06-25 ENCOUNTER — OFFICE VISIT (OUTPATIENT)
Dept: FAMILY MEDICINE CLINIC | Facility: CLINIC | Age: 70
End: 2025-06-25
Payer: COMMERCIAL

## 2025-06-25 VITALS
DIASTOLIC BLOOD PRESSURE: 70 MMHG | OXYGEN SATURATION: 98 % | TEMPERATURE: 97.7 F | SYSTOLIC BLOOD PRESSURE: 118 MMHG | HEIGHT: 73 IN | WEIGHT: 149.4 LBS | BODY MASS INDEX: 19.8 KG/M2 | HEART RATE: 55 BPM

## 2025-06-25 DIAGNOSIS — R73.03 PREDIABETES: ICD-10-CM

## 2025-06-25 DIAGNOSIS — Z00.00 MEDICARE ANNUAL WELLNESS VISIT, SUBSEQUENT: Primary | ICD-10-CM

## 2025-06-25 DIAGNOSIS — E55.9 VITAMIN D DEFICIENCY: ICD-10-CM

## 2025-06-25 DIAGNOSIS — E78.00 ELEVATED LDL CHOLESTEROL LEVEL: ICD-10-CM

## 2025-06-25 DIAGNOSIS — R63.4 WEIGHT LOSS: ICD-10-CM

## 2025-06-25 DIAGNOSIS — R97.20 ELEVATED PSA: ICD-10-CM

## 2025-06-25 PROCEDURE — G0439 PPPS, SUBSEQ VISIT: HCPCS | Performed by: FAMILY MEDICINE

## 2025-06-25 RX ORDER — AMOXICILLIN 500 MG/1
CAPSULE ORAL
COMMUNITY
Start: 2025-04-28 | End: 2025-06-25 | Stop reason: ALTCHOICE

## 2025-06-25 RX ORDER — SODIUM, POTASSIUM,MAG SULFATES 17.5-3.13G
SOLUTION, RECONSTITUTED, ORAL ORAL
COMMUNITY
Start: 2025-05-06

## 2025-06-25 NOTE — PROGRESS NOTES
Name: Jun Patterson      : 1955      MRN: [id number]  Encounter Provider: June Ríos MD  Encounter Date: 2025   Encounter department: [facility]  :  Assessment & Plan  Medicare annual wellness visit, subsequent  - Preventive health issues were discussed with patient, and age appropriate screening tests were ordered as noted in patient's After Visit Summary.   - Personalized health advice and appropriate referrals for health education or preventive services given if needed, as noted in patient's After Visit Summary.  - Patient to get lab work completed at his earliest convenience   - Discussed obtaining ultrasound for mass on left chest wall however patient declined at this time and would prefer to monitor for now  - Follow up as needed or in one year for medicare wellness visit        Prediabetes    Orders:    CBC and differential; Future    Comprehensive metabolic panel; Future    Hemoglobin A1C; Future    Elevated LDL cholesterol level    Orders:    Lipid Panel with Direct LDL reflex; Future    Elevated PSA    Orders:    PSA Total, Diagnostic; Future    Vitamin D deficiency    Orders:    Vitamin D 25 hydroxy; Future    Weight loss    Orders:    TSH, 3rd generation with Free T4 reflex; Future           History of Present Illness     Jun Patterson is a very pleasant 70 year old male who presents today for Medicare wellness visit.  Patient was sick with influenza in February but otherwise endorses no complaints this time.  He did recently have a colonoscopy and is still awaiting the results.  He reports that he has lost some more muscle mass which he attributes to increased physical activity at work and not enough caloric intake.  He has noticed that since he has lost weight that there is what he believes to be a lipoma on his chest.  He does have a history of other lipomas and had one on the abdomen which was removed.    Patient Care Team:  June Ríos MD as PCP - General  (Family Medicine)  Albaro Smith MD (Urology)    Review of Systems   Constitutional:         Weight loss   HENT: Negative.     Eyes: Negative.    Respiratory: Negative.     Cardiovascular: Negative.    Gastrointestinal: Negative.    Musculoskeletal: Negative.    Skin: Negative.    Neurological: Negative.    Psychiatric/Behavioral: Negative.       Medical History Reviewed by provider this encounter:       Annual Wellness Visit Questionnaire   Jun is here for his Subsequent Wellness visit.     Health Risk Assessment:   Patient rates overall health as very good. Patient feels that their physical health rating is same. Patient is very satisfied with their life. Eyesight was rated as same. Hearing was rated as same. Patient feels that their emotional and mental health rating is slightly better. Patients states they are never, rarely angry. Patient states they are sometimes unusually tired/fatigued. Pain experienced in the last 7 days has been some. Patient's pain rating has been 1/10. Patient states that he has experienced no weight loss or gain in last 6 months.     Depression Screening:   PHQ-2 Score: 0      Fall Risk Screening:   In the past year, patient has experienced: no history of falling in past year      Home Safety:  Patient does not have trouble with stairs inside or outside of their home. Patient has working smoke alarms and has working carbon monoxide detector. Home safety hazards include: none.     Nutrition:   Current diet is Regular.     Medications:   Patient is currently taking over-the-counter supplements. OTC medications include: see medication list. Patient is able to manage medications.     Activities of Daily Living (ADLs)/Instrumental Activities of Daily Living (IADLs):   Walk and transfer into and out of bed and chair?: Yes  Dress and groom yourself?: Yes    Bathe or shower yourself?: Yes    Feed yourself? Yes  Do your laundry/housekeeping?: Yes  Manage your money, pay your bills and track  your expenses?: Yes  Make your own meals?: Yes    Do your own shopping?: Yes    Previous Hospitalizations:   Any hospitalizations or ED visits within the last 12 months?: No      Advance Care Planning:   Living will: No    Durable POA for healthcare: No    Advanced directive: No      Preventive Screenings      Cardiovascular Screening:    General: Screening Current      Diabetes Screening:     General: Screening Current      Colorectal Cancer Screening:     General: Screening Current      Prostate Cancer Screening:    General: Screening Current      Abdominal Aortic Aneurysm (AAA) Screening:    Risk factors include: age between 65-74 yo        Lung Cancer Screening:     General: Screening Not Indicated      Hepatitis C Screening:    General: Screening Current    Immunizations:  - Immunizations due: Prevnar 20 and Zoster (Shingrix)    Screening, Brief Intervention, and Referral to Treatment (SBIRT)     Screening  Typical number of drinks in a day: 0  Typical number of drinks in a week: 4  Interpretation: Low risk drinking behavior.    AUDIT-C Screenin) How often did you have a drink containing alcohol in the past year? 2 to 3 times a week  2) How many drinks did you have on a typical day when you were drinking in the past year? 1 to 2  3) How often did you have 6 or more drinks on one occasion in the past year? never    AUDIT-C Score: 3  Interpretation: Score 0-3 (male): Negative screen for alcohol misuse    Single Item Drug Screening:  How often have you used an illegal drug (including marijuana) or a prescription medication for non-medical reasons in the past year? weekly    Single Item Drug Screen Score: 3  Interpretation: POSITIVE screen for possible drug use disorder    Drug Abuse Screening Test (DAST-10):  1) Have you used drugs other than those required for medical reasons? Yes  2) Do you abuse more than one drug at a time? No  3) Are you always able to stop using drugs when you want to? Yes  4) Have you  "had \"blackouts\" or \"flashbacks\" as a result of drug use? No  5) Do you ever feel bad or guilty about your drug use? No  6) Does your spouse (or parents) ever complain about your involvement with drugs? No  7) Have you neglected your family because of your use of drugs? No  8) Have you engaged in illegal activities in order to obtain drugs? No  9) Have you ever experienced withdrawal symptoms (felt sick) when you stopped taking drugs? No  10) Have you had medical problems as a result of your drug use (e.g., memory loss, hepatitis, convulsions, bleeding, etc.)? No    DAST-10 Score: 1  Interpretation: Low level problems related to drug abuse    Social Drivers of Health     Financial Resource Strain: Low Risk  (6/15/2023)    Overall Financial Resource Strain (CARDIA)     Difficulty of Paying Living Expenses: Not hard at all   Food Insecurity: No Food Insecurity (6/21/2025)    Nursing - Inadequate Food Risk Classification     Worried About Running Out of Food in the Last Year: Never true     Ran Out of Food in the Last Year: Never true   Transportation Needs: No Transportation Needs (6/21/2025)    PRAPARE - Transportation     Lack of Transportation (Medical): No     Lack of Transportation (Non-Medical): No   Housing Stability: Low Risk  (6/21/2025)    Housing Stability Vital Sign     Unable to Pay for Housing in the Last Year: No     Number of Times Moved in the Last Year: 0     Homeless in the Last Year: No   Utilities: Not At Risk (6/21/2025)    [facility] Utilities     Threatened with loss of utilities: No     No results found.    Objective   There were no vitals taken for this visit.    Physical Exam  Constitutional:       General: He is not in acute distress.     Appearance: He is not ill-appearing.   HENT:      Head: Normocephalic and atraumatic.     Eyes:      General:         Right eye: No discharge.         Left eye: No discharge.      Extraocular Movements: Extraocular movements intact.       Cardiovascular:      Rate " and Rhythm: Normal rate.   Pulmonary:      Effort: Pulmonary effort is normal. No respiratory distress.   Chest:      Chest wall: Mass present.      Comments: Palpable mass noted overlying the left pectoral muscle, no overlying skin changes, non tender to palpation   Abdominal:      General: There is no distension.      Palpations: Abdomen is soft.      Tenderness: There is no abdominal tenderness.     Musculoskeletal:      Right lower leg: No edema.      Left lower leg: No edema.     Neurological:      General: No focal deficit present.      Mental Status: He is alert.     Psychiatric:         Mood and Affect: Mood normal.         Behavior: Behavior normal.

## 2025-06-25 NOTE — PATIENT INSTRUCTIONS
Medicare Preventive Visit Patient Instructions  Thank you for completing your Welcome to Medicare Visit or Medicare Annual Wellness Visit today. Your next wellness visit will be due in one year (6/26/2026).  The screening/preventive services that you may require over the next 5-10 years are detailed below. Some tests may not apply to you based off risk factors and/or age. Screening tests ordered at today's visit but not completed yet may show as past due. Also, please note that scanned in results may not display below.  Preventive Screenings:  Service Recommendations Previous Testing/Comments   Colorectal Cancer Screening  Colonoscopy    Fecal Occult Blood Test (FOBT)/Fecal Immunochemical Test (FIT)  Fecal DNA/Cologuard Test  Flexible Sigmoidoscopy Age: 45-75 years old   Colonoscopy: every 10 years (May be performed more frequently if at higher risk)  OR  FOBT/FIT: every 1 year  OR  Cologuard: every 3 years  OR  Sigmoidoscopy: every 5 years  Screening may be recommended earlier than age 45 if at higher risk for colorectal cancer. Also, an individualized decision between you and your healthcare provider will decide whether screening between the ages of 76-85 would be appropriate. Colonoscopy: 06/01/2015  FOBT/FIT: Not on file  Cologuard: Not on file  Sigmoidoscopy: Not on file          Prostate Cancer Screening Individualized decision between patient and health care provider in men between ages of 55-69   Medicare will cover every 12 months beginning on the day after your 50th birthday PSA: 7.3 ng/mL           Hepatitis C Screening Once for adults born between 1945 and 1965  More frequently in patients at high risk for Hepatitis C Hep C Antibody: 09/13/2019        Diabetes Screening 1-2 times per year if you're at risk for diabetes or have pre-diabetes Fasting glucose: 83 mg/dL (5/13/2021)  A1C: 5.9 % (6/27/2024)      Cholesterol Screening Once every 5 years if you don't have a lipid disorder. May order more often  based on risk factors. Lipid panel: 06/27/2024         Other Preventive Screenings Covered by Medicare:  Abdominal Aortic Aneurysm (AAA) Screening: covered once if your at risk. You're considered to be at risk if you have a family history of AAA or a male between the age of 65-75 who smoking at least 100 cigarettes in your lifetime.  Lung Cancer Screening: covers low dose CT scan once per year if you meet all of the following conditions: (1) Age 55-77; (2) No signs or symptoms of lung cancer; (3) Current smoker or have quit smoking within the last 15 years; (4) You have a tobacco smoking history of at least 20 pack years (packs per day x number of years you smoked); (5) You get a written order from a healthcare provider.  Glaucoma Screening: covered annually if you're considered high risk: (1) You have diabetes OR (2) Family history of glaucoma OR (3)  aged 50 and older OR (4)  American aged 65 and older  Osteoporosis Screening: covered every 2 years if you meet one of the following conditions: (1) Have a vertebral abnormality; (2) On glucocorticoid therapy for more than 3 months; (3) Have primary hyperparathyroidism; (4) On osteoporosis medications and need to assess response to drug therapy.  HIV Screening: covered annually if you're between the age of 15-65. Also covered annually if you are younger than 15 and older than 65 with risk factors for HIV infection. For pregnant patients, it is covered up to 3 times per pregnancy.    Immunizations:  Immunization Recommendations   Influenza Vaccine Annual influenza vaccination during flu season is recommended for all persons aged >= 6 months who do not have contraindications   Pneumococcal Vaccine   * Pneumococcal conjugate vaccine = PCV13 (Prevnar 13), PCV15 (Vaxneuvance), PCV20 (Prevnar 20)  * Pneumococcal polysaccharide vaccine = PPSV23 (Pneumovax) Adults 19-63 yo with certain risk factors or if 65+ yo  If never received any pneumonia vaccine:  recommend Prevnar 20 (PCV20)  Give PCV20 if previously received 1 dose of PCV13 or PPSV23   Hepatitis B Vaccine 3 dose series if at intermediate or high risk (ex: diabetes, end stage renal disease, liver disease)   Respiratory syncytial virus (RSV) Vaccine - COVERED BY MEDICARE PART D  * RSVPreF3 (Arexvy) CDC recommends that adults 60 years of age and older may receive a single dose of RSV vaccine using shared clinical decision-making (SCDM)   Tetanus (Td) Vaccine - COST NOT COVERED BY MEDICARE PART B Following completion of primary series, a booster dose should be given every 10 years to maintain immunity against tetanus. Td may also be given as tetanus wound prophylaxis.   Tdap Vaccine - COST NOT COVERED BY MEDICARE PART B Recommended at least once for all adults. For pregnant patients, recommended with each pregnancy.   Shingles Vaccine (Shingrix) - COST NOT COVERED BY MEDICARE PART B  2 shot series recommended in those 19 years and older who have or will have weakened immune systems or those 50 years and older     Health Maintenance Due:      Topic Date Due   • Colorectal Cancer Screening  06/01/2025   • Hepatitis C Screening  Completed     Immunizations Due:      Topic Date Due   • Pneumococcal Vaccine: 50+ Years (1 of 1 - PCV) Never done   • COVID-19 Vaccine (5 - 2024-25 season) 09/01/2024   • Influenza Vaccine (Season Ended) 09/01/2025     Advance Directives   What are advance directives?  Advance directives are legal documents that state your wishes and plans for medical care. These plans are made ahead of time in case you lose your ability to make decisions for yourself. Advance directives can apply to any medical decision, such as the treatments you want, and if you want to donate organs.   What are the types of advance directives?  There are many types of advance directives, and each state has rules about how to use them. You may choose a combination of any of the following:  Living will:  This is a  written record of the treatment you want. You can also choose which treatments you do not want, which to limit, and which to stop at a certain time. This includes surgery, medicine, IV fluid, and tube feedings.   Durable power of  for healthcare (DPAHC):  This is a written record that states who you want to make healthcare choices for you when you are unable to make them for yourself. This person, called a proxy, is usually a family member or a friend. You may choose more than 1 proxy.  Do not resuscitate (DNR) order:  A DNR order is used in case your heart stops beating or you stop breathing. It is a request not to have certain forms of treatment, such as CPR. A DNR order may be included in other types of advance directives.  Medical directive:  This covers the care that you want if you are in a coma, near death, or unable to make decisions for yourself. You can list the treatments you want for each condition. Treatment may include pain medicine, surgery, blood transfusions, dialysis, IV or tube feedings, and a ventilator (breathing machine).  Values history:  This document has questions about your views, beliefs, and how you feel and think about life. This information can help others choose the care that you would choose.  Why are advance directives important?  An advance directive helps you control your care. Although spoken wishes may be used, it is better to have your wishes written down. Spoken wishes can be misunderstood, or not followed. Treatments may be given even if you do not want them. An advance directive may make it easier for your family to make difficult choices about your care.       © Copyright [employer] 2018 Information is for End User's use only and may not be sold, redistributed or otherwise used for commercial purposes. All illustrations and images included in CareNotes® are the copyrighted property of [employer] or [employer]

## 2025-07-03 ENCOUNTER — APPOINTMENT (OUTPATIENT)
Dept: LAB | Facility: CLINIC | Age: 70
End: 2025-07-03
Payer: COMMERCIAL

## 2025-07-03 ENCOUNTER — VBI (OUTPATIENT)
Dept: ADMINISTRATIVE | Facility: OTHER | Age: 70
End: 2025-07-03

## 2025-07-03 DIAGNOSIS — E78.00 ELEVATED LDL CHOLESTEROL LEVEL: ICD-10-CM

## 2025-07-03 DIAGNOSIS — R73.03 PREDIABETES: ICD-10-CM

## 2025-07-03 DIAGNOSIS — E55.9 VITAMIN D DEFICIENCY: ICD-10-CM

## 2025-07-03 DIAGNOSIS — R97.20 ELEVATED PSA: ICD-10-CM

## 2025-07-03 DIAGNOSIS — R63.4 WEIGHT LOSS: ICD-10-CM

## 2025-07-03 LAB
25(OH)D3 SERPL-MCNC: 53 NG/ML (ref 30–100)
ALBUMIN SERPL BCG-MCNC: 4.3 G/DL (ref 3.5–5)
ALP SERPL-CCNC: 62 U/L (ref 34–104)
ALT SERPL W P-5'-P-CCNC: 14 U/L (ref 7–52)
ANION GAP SERPL CALCULATED.3IONS-SCNC: 8 MMOL/L (ref 4–13)
AST SERPL W P-5'-P-CCNC: 19 U/L (ref 13–39)
BASOPHILS # BLD AUTO: 0.04 THOUSANDS/ÂΜL (ref 0–0.1)
BASOPHILS NFR BLD AUTO: 1 % (ref 0–1)
BILIRUB SERPL-MCNC: 0.91 MG/DL (ref 0.2–1)
BUN SERPL-MCNC: 13 MG/DL (ref 5–25)
CALCIUM SERPL-MCNC: 9.5 MG/DL (ref 8.4–10.2)
CHLORIDE SERPL-SCNC: 104 MMOL/L (ref 96–108)
CHOLEST SERPL-MCNC: 178 MG/DL (ref ?–200)
CO2 SERPL-SCNC: 28 MMOL/L (ref 21–32)
CREAT SERPL-MCNC: 0.77 MG/DL (ref 0.6–1.3)
EOSINOPHIL # BLD AUTO: 0.16 THOUSAND/ÂΜL (ref 0–0.61)
EOSINOPHIL NFR BLD AUTO: 3 % (ref 0–6)
ERYTHROCYTE [DISTWIDTH] IN BLOOD BY AUTOMATED COUNT: 12.6 % (ref 11.6–15.1)
EST. AVERAGE GLUCOSE BLD GHB EST-MCNC: 131 MG/DL
GFR SERPL CREATININE-BSD FRML MDRD: 93 ML/MIN/1.73SQ M
GLUCOSE P FAST SERPL-MCNC: 90 MG/DL (ref 65–99)
HBA1C MFR BLD: 6.2 %
HCT VFR BLD AUTO: 41.5 % (ref 36.5–49.3)
HDLC SERPL-MCNC: 65 MG/DL
HGB BLD-MCNC: 13.5 G/DL (ref 12–17)
IMM GRANULOCYTES # BLD AUTO: 0.02 THOUSAND/UL (ref 0–0.2)
IMM GRANULOCYTES NFR BLD AUTO: 0 % (ref 0–2)
LDLC SERPL CALC-MCNC: 102 MG/DL (ref 0–100)
LYMPHOCYTES # BLD AUTO: 1 THOUSANDS/ÂΜL (ref 0.6–4.47)
LYMPHOCYTES NFR BLD AUTO: 20 % (ref 14–44)
MCH RBC QN AUTO: 31.7 PG (ref 26.8–34.3)
MCHC RBC AUTO-ENTMCNC: 32.5 G/DL (ref 31.4–37.4)
MCV RBC AUTO: 97 FL (ref 82–98)
MONOCYTES # BLD AUTO: 0.53 THOUSAND/ÂΜL (ref 0.17–1.22)
MONOCYTES NFR BLD AUTO: 10 % (ref 4–12)
NEUTROPHILS # BLD AUTO: 3.35 THOUSANDS/ÂΜL (ref 1.85–7.62)
NEUTS SEG NFR BLD AUTO: 66 % (ref 43–75)
NRBC BLD AUTO-RTO: 0 /100 WBCS
PLATELET # BLD AUTO: 160 THOUSANDS/UL (ref 149–390)
PMV BLD AUTO: 13.9 FL (ref 8.9–12.7)
POTASSIUM SERPL-SCNC: 4.1 MMOL/L (ref 3.5–5.3)
PROT SERPL-MCNC: 6.8 G/DL (ref 6.4–8.4)
PSA SERPL-MCNC: 11.62 NG/ML (ref 0–4)
RBC # BLD AUTO: 4.26 MILLION/UL (ref 3.88–5.62)
SODIUM SERPL-SCNC: 140 MMOL/L (ref 135–147)
TRIGL SERPL-MCNC: 57 MG/DL (ref ?–150)
TSH SERPL DL<=0.05 MIU/L-ACNC: 1.59 UIU/ML (ref 0.45–4.5)
WBC # BLD AUTO: 5.1 THOUSAND/UL (ref 4.31–10.16)

## 2025-07-03 PROCEDURE — 85025 COMPLETE CBC W/AUTO DIFF WBC: CPT

## 2025-07-03 PROCEDURE — 82306 VITAMIN D 25 HYDROXY: CPT

## 2025-07-03 PROCEDURE — 80061 LIPID PANEL: CPT

## 2025-07-03 PROCEDURE — 36415 COLL VENOUS BLD VENIPUNCTURE: CPT

## 2025-07-03 PROCEDURE — 84153 ASSAY OF PSA TOTAL: CPT

## 2025-07-03 PROCEDURE — 84443 ASSAY THYROID STIM HORMONE: CPT

## 2025-07-03 PROCEDURE — 83036 HEMOGLOBIN GLYCOSYLATED A1C: CPT

## 2025-07-03 PROCEDURE — 80053 COMPREHEN METABOLIC PANEL: CPT

## 2025-07-03 NOTE — TELEPHONE ENCOUNTER
07/03/25 12:19 PM     Chart reviewed for CRC: Colonoscopy was/were submitted to the patient's insurance.     Mara Tavares   [facility]

## 2025-07-11 DIAGNOSIS — R97.20 ELEVATED PSA: Primary | ICD-10-CM
